# Patient Record
Sex: MALE | Race: WHITE | NOT HISPANIC OR LATINO | Employment: FULL TIME | URBAN - NONMETROPOLITAN AREA
[De-identification: names, ages, dates, MRNs, and addresses within clinical notes are randomized per-mention and may not be internally consistent; named-entity substitution may affect disease eponyms.]

---

## 2020-02-09 ENCOUNTER — ANESTHESIA EVENT (INPATIENT)
Dept: PERIOP | Facility: HOSPITAL | Age: 40
DRG: 355 | End: 2020-02-09
Payer: COMMERCIAL

## 2020-02-09 ENCOUNTER — APPOINTMENT (EMERGENCY)
Dept: CT IMAGING | Facility: HOSPITAL | Age: 40
DRG: 355 | End: 2020-02-09
Payer: COMMERCIAL

## 2020-02-09 ENCOUNTER — HOSPITAL ENCOUNTER (INPATIENT)
Facility: HOSPITAL | Age: 40
LOS: 1 days | Discharge: HOME | DRG: 355 | End: 2020-02-10
Attending: EMERGENCY MEDICINE | Admitting: SURGERY
Payer: COMMERCIAL

## 2020-02-09 DIAGNOSIS — K43.6 INCARCERATED VENTRAL HERNIA: Primary | ICD-10-CM

## 2020-02-09 DIAGNOSIS — K43.6 STRANGULATED HERNIA OF ABDOMINAL WALL: ICD-10-CM

## 2020-02-09 LAB
ALBUMIN SERPL BCP-MCNC: 4 G/DL (ref 3.5–5)
ALP SERPL-CCNC: 134 U/L (ref 46–116)
ALT SERPL W P-5'-P-CCNC: 34 U/L (ref 12–78)
ANION GAP SERPL CALCULATED.3IONS-SCNC: 5 MMOL/L (ref 4–13)
AST SERPL W P-5'-P-CCNC: 15 U/L (ref 5–45)
BASOPHILS # BLD AUTO: 0.06 THOUSANDS/ΜL (ref 0–0.1)
BASOPHILS NFR BLD AUTO: 1 % (ref 0–1)
BILIRUB DIRECT SERPL-MCNC: 0.11 MG/DL (ref 0–0.2)
BILIRUB SERPL-MCNC: 0.32 MG/DL (ref 0.2–1)
BUN SERPL-MCNC: 14 MG/DL (ref 5–25)
CALCIUM SERPL-MCNC: 8.3 MG/DL (ref 8.3–10.1)
CHLORIDE SERPL-SCNC: 103 MMOL/L (ref 100–108)
CO2 SERPL-SCNC: 31 MMOL/L (ref 21–32)
CREAT SERPL-MCNC: 0.97 MG/DL (ref 0.6–1.3)
EOSINOPHIL # BLD AUTO: 0.18 THOUSAND/ΜL (ref 0–0.61)
EOSINOPHIL NFR BLD AUTO: 2 % (ref 0–6)
ERYTHROCYTE [DISTWIDTH] IN BLOOD BY AUTOMATED COUNT: 12.6 % (ref 11.6–15.1)
GFR SERPL CREATININE-BSD FRML MDRD: 99 ML/MIN/1.73SQ M
GLUCOSE SERPL-MCNC: 88 MG/DL (ref 65–140)
HCT VFR BLD AUTO: 43.1 % (ref 36.5–49.3)
HGB BLD-MCNC: 14.1 G/DL (ref 12–17)
IMM GRANULOCYTES # BLD AUTO: 0.09 THOUSAND/UL (ref 0–0.2)
IMM GRANULOCYTES NFR BLD AUTO: 1 % (ref 0–2)
LACTATE SERPL-SCNC: 1.2 MMOL/L (ref 0.5–2)
LIPASE SERPL-CCNC: 78 U/L (ref 73–393)
LYMPHOCYTES # BLD AUTO: 2.28 THOUSANDS/ΜL (ref 0.6–4.47)
LYMPHOCYTES NFR BLD AUTO: 18 % (ref 14–44)
MCH RBC QN AUTO: 28.5 PG (ref 26.8–34.3)
MCHC RBC AUTO-ENTMCNC: 32.7 G/DL (ref 31.4–37.4)
MCV RBC AUTO: 87 FL (ref 82–98)
MONOCYTES # BLD AUTO: 0.92 THOUSAND/ΜL (ref 0.17–1.22)
MONOCYTES NFR BLD AUTO: 7 % (ref 4–12)
NEUTROPHILS # BLD AUTO: 8.84 THOUSANDS/ΜL (ref 1.85–7.62)
NEUTS SEG NFR BLD AUTO: 71 % (ref 43–75)
NRBC BLD AUTO-RTO: 0 /100 WBCS
PLATELET # BLD AUTO: 375 THOUSANDS/UL (ref 149–390)
PMV BLD AUTO: 10.3 FL (ref 8.9–12.7)
POTASSIUM SERPL-SCNC: 3.6 MMOL/L (ref 3.5–5.3)
PROT SERPL-MCNC: 7.6 G/DL (ref 6.4–8.2)
RBC # BLD AUTO: 4.95 MILLION/UL (ref 3.88–5.62)
SODIUM SERPL-SCNC: 139 MMOL/L (ref 136–145)
WBC # BLD AUTO: 12.37 THOUSAND/UL (ref 4.31–10.16)

## 2020-02-09 PROCEDURE — 74177 CT ABD & PELVIS W/CONTRAST: CPT

## 2020-02-09 PROCEDURE — 80076 HEPATIC FUNCTION PANEL: CPT | Performed by: EMERGENCY MEDICINE

## 2020-02-09 PROCEDURE — 83605 ASSAY OF LACTIC ACID: CPT | Performed by: EMERGENCY MEDICINE

## 2020-02-09 PROCEDURE — 99285 EMERGENCY DEPT VISIT HI MDM: CPT | Performed by: EMERGENCY MEDICINE

## 2020-02-09 PROCEDURE — 85025 COMPLETE CBC W/AUTO DIFF WBC: CPT | Performed by: EMERGENCY MEDICINE

## 2020-02-09 PROCEDURE — 83690 ASSAY OF LIPASE: CPT | Performed by: EMERGENCY MEDICINE

## 2020-02-09 PROCEDURE — 96374 THER/PROPH/DIAG INJ IV PUSH: CPT

## 2020-02-09 PROCEDURE — 80048 BASIC METABOLIC PNL TOTAL CA: CPT | Performed by: EMERGENCY MEDICINE

## 2020-02-09 PROCEDURE — 36415 COLL VENOUS BLD VENIPUNCTURE: CPT | Performed by: EMERGENCY MEDICINE

## 2020-02-09 PROCEDURE — 96375 TX/PRO/DX INJ NEW DRUG ADDON: CPT

## 2020-02-09 PROCEDURE — 96361 HYDRATE IV INFUSION ADD-ON: CPT

## 2020-02-09 PROCEDURE — 96376 TX/PRO/DX INJ SAME DRUG ADON: CPT

## 2020-02-09 PROCEDURE — 99285 EMERGENCY DEPT VISIT HI MDM: CPT

## 2020-02-09 RX ORDER — MORPHINE SULFATE 10 MG/ML
6 INJECTION, SOLUTION INTRAMUSCULAR; INTRAVENOUS
Status: DISCONTINUED | OUTPATIENT
Start: 2020-02-09 | End: 2020-02-10 | Stop reason: HOSPADM

## 2020-02-09 RX ORDER — SODIUM CHLORIDE 9 MG/ML
125 INJECTION, SOLUTION INTRAVENOUS CONTINUOUS
Status: DISCONTINUED | OUTPATIENT
Start: 2020-02-09 | End: 2020-02-10 | Stop reason: HOSPADM

## 2020-02-09 RX ORDER — ONDANSETRON 2 MG/ML
4 INJECTION INTRAMUSCULAR; INTRAVENOUS EVERY 4 HOURS PRN
Status: DISCONTINUED | OUTPATIENT
Start: 2020-02-09 | End: 2020-02-10 | Stop reason: HOSPADM

## 2020-02-09 RX ORDER — VILAZODONE HYDROCHLORIDE 20 MG/1
20 TABLET ORAL DAILY
COMMUNITY
Start: 2020-02-05

## 2020-02-09 RX ORDER — DIPHENHYDRAMINE HYDROCHLORIDE 50 MG/ML
25 INJECTION INTRAMUSCULAR; INTRAVENOUS ONCE
Status: COMPLETED | OUTPATIENT
Start: 2020-02-09 | End: 2020-02-09

## 2020-02-09 RX ORDER — DEXLANSOPRAZOLE 60 MG/1
60 CAPSULE, DELAYED RELEASE ORAL DAILY
COMMUNITY
Start: 2020-02-05 | End: 2020-03-18

## 2020-02-09 RX ORDER — MORPHINE SULFATE 10 MG/ML
6 INJECTION, SOLUTION INTRAMUSCULAR; INTRAVENOUS ONCE
Status: COMPLETED | OUTPATIENT
Start: 2020-02-09 | End: 2020-02-09

## 2020-02-09 RX ORDER — ALPRAZOLAM 0.25 MG/1
0.25 TABLET ORAL 3 TIMES DAILY PRN
COMMUNITY
End: 2021-12-19 | Stop reason: ALTCHOICE

## 2020-02-09 RX ORDER — METOCLOPRAMIDE HYDROCHLORIDE 5 MG/ML
10 INJECTION INTRAMUSCULAR; INTRAVENOUS ONCE
Status: COMPLETED | OUTPATIENT
Start: 2020-02-09 | End: 2020-02-09

## 2020-02-09 RX ORDER — ALPRAZOLAM 0.5 MG/1
0.5 TABLET ORAL 3 TIMES DAILY PRN
Status: DISCONTINUED | OUTPATIENT
Start: 2020-02-09 | End: 2020-02-10 | Stop reason: HOSPADM

## 2020-02-09 RX ORDER — ONDANSETRON 2 MG/ML
4 INJECTION INTRAMUSCULAR; INTRAVENOUS ONCE
Status: COMPLETED | OUTPATIENT
Start: 2020-02-09 | End: 2020-02-09

## 2020-02-09 RX ORDER — RANITIDINE 150 MG/1
150 TABLET ORAL
COMMUNITY
Start: 2018-12-05 | End: 2020-03-18

## 2020-02-09 RX ORDER — OMEPRAZOLE 20 MG/1
20 CAPSULE, DELAYED RELEASE ORAL DAILY
COMMUNITY
End: 2020-03-18

## 2020-02-09 RX ADMIN — MORPHINE SULFATE 6 MG: 10 INJECTION INTRAVENOUS at 10:42

## 2020-02-09 RX ADMIN — ONDANSETRON 4 MG: 2 INJECTION INTRAMUSCULAR; INTRAVENOUS at 15:29

## 2020-02-09 RX ADMIN — SODIUM CHLORIDE 125 ML/HR: 0.9 INJECTION, SOLUTION INTRAVENOUS at 05:51

## 2020-02-09 RX ADMIN — ALPRAZOLAM 0.5 MG: 0.5 TABLET ORAL at 08:51

## 2020-02-09 RX ADMIN — ONDANSETRON 4 MG: 2 INJECTION INTRAMUSCULAR; INTRAVENOUS at 02:48

## 2020-02-09 RX ADMIN — ONDANSETRON 4 MG: 2 INJECTION INTRAMUSCULAR; INTRAVENOUS at 08:51

## 2020-02-09 RX ADMIN — SODIUM CHLORIDE 1000 ML: 0.9 INJECTION, SOLUTION INTRAVENOUS at 00:59

## 2020-02-09 RX ADMIN — ONDANSETRON 4 MG: 2 INJECTION INTRAMUSCULAR; INTRAVENOUS at 21:33

## 2020-02-09 RX ADMIN — SODIUM CHLORIDE 125 ML/HR: 0.9 INJECTION, SOLUTION INTRAVENOUS at 13:25

## 2020-02-09 RX ADMIN — DIPHENHYDRAMINE HYDROCHLORIDE 25 MG: 50 INJECTION, SOLUTION INTRAMUSCULAR; INTRAVENOUS at 01:01

## 2020-02-09 RX ADMIN — IOHEXOL 100 ML: 350 INJECTION, SOLUTION INTRAVENOUS at 01:50

## 2020-02-09 RX ADMIN — MORPHINE SULFATE 6 MG: 10 INJECTION INTRAVENOUS at 21:32

## 2020-02-09 RX ADMIN — MORPHINE SULFATE 6 MG: 10 INJECTION INTRAVENOUS at 02:46

## 2020-02-09 RX ADMIN — ALPRAZOLAM 0.5 MG: 0.5 TABLET ORAL at 21:33

## 2020-02-09 RX ADMIN — METOCLOPRAMIDE HYDROCHLORIDE 10 MG: 5 INJECTION INTRAMUSCULAR; INTRAVENOUS at 01:01

## 2020-02-09 RX ADMIN — MORPHINE SULFATE 6 MG: 10 INJECTION INTRAVENOUS at 18:18

## 2020-02-09 RX ADMIN — SODIUM CHLORIDE 125 ML/HR: 0.9 INJECTION, SOLUTION INTRAVENOUS at 21:47

## 2020-02-09 RX ADMIN — MORPHINE SULFATE 6 MG: 10 INJECTION INTRAVENOUS at 01:01

## 2020-02-09 RX ADMIN — MORPHINE SULFATE 6 MG: 10 INJECTION INTRAVENOUS at 15:24

## 2020-02-09 NOTE — ED PROVIDER NOTES
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, vomiting and abdominal pain  Hx of ventral hernia w/prior incarceration, but ileus at that time which was managed medically and the patient did not have surgery  Did not take any pain medication PTA  History provided by:  Patient   used: No    Abdominal Pain   Pain location:  Generalized  Pain quality: aching, sharp and throbbing    Pain radiates to:  Does not radiate  Pain severity:  Severe  Onset quality:  Sudden  Timing:  Constant  Progression:  Unchanged  Chronicity:  Recurrent  Relieved by:  Nothing  Worsened by: Movement and palpation  Ineffective treatments:  None tried  Associated symptoms: no anorexia, no belching, no chest pain, no chills, no constipation, no cough, no diarrhea, no fatigue, no fever, no flatus, no hematemesis, no hematochezia, no hematuria, no nausea, no shortness of breath, no sore throat and no vomiting        Prior to Admission Medications   Prescriptions Last Dose Informant Patient Reported? Taking? ALPRAZolam (XANAX) 0 25 mg tablet  Self Yes Yes   Sig: Take 0 25 mg by mouth 3 (three) times a day as needed for anxiety   dexlansoprazole (DEXILANT) 60 MG capsule Not Taking at Unknown time  Yes No   Sig: Take 60 mg by mouth daily    omeprazole (PriLOSEC) 20 mg delayed release capsule 2/8/2020 at Unknown time  Yes Yes   Sig: Take 20 mg by mouth daily   ranitidine (ZANTAC) 150 mg tablet Not Taking at Unknown time  Yes No   Sig: Take 150 mg by mouth   vilazodone (VIIBRYD) 20 mg tablet 2/8/2020 at Unknown time  Yes Yes   Sig: Take 20 mg by mouth daily      Facility-Administered Medications: None       History reviewed  No pertinent past medical history  History reviewed  No pertinent surgical history  History reviewed   No pertinent family history  I have reviewed and agree with the history as documented  Social History     Tobacco Use    Smoking status: Never Smoker    Smokeless tobacco: Never Used   Substance Use Topics    Alcohol use: Yes     Comment: rarely    Drug use: Not Currently        Review of Systems   Constitutional: Negative for chills, fatigue and fever  HENT: Negative for sore throat  Eyes: Negative for visual disturbance  Respiratory: Negative for cough and shortness of breath  Cardiovascular: Negative for chest pain  Gastrointestinal: Positive for abdominal pain  Negative for anorexia, constipation, diarrhea, flatus, hematemesis, hematochezia, nausea and vomiting  Genitourinary: Negative for difficulty urinating, flank pain, hematuria and scrotal swelling  Musculoskeletal: Negative for arthralgias, gait problem and myalgias  Neurological: Negative for dizziness and weakness  Hematological: Does not bruise/bleed easily  Psychiatric/Behavioral: Negative for agitation, behavioral problems and confusion  Physical Exam  Physical Exam   Constitutional: He is oriented to person, place, and time  He appears well-developed and well-nourished  HENT:   Head: Normocephalic and atraumatic  Mouth/Throat: Oropharynx is clear and moist    Eyes: Pupils are equal, round, and reactive to light  Cardiovascular: Normal rate and regular rhythm  Pulmonary/Chest: Effort normal    Abdominal: Bowel sounds are normal  He exhibits distension  He exhibits no mass  There is generalized tenderness  There is no rigidity, no rebound and no guarding  No hernia  Hernia confirmed negative in the right inguinal area and confirmed negative in the left inguinal area  Cannot palpate any obvious hernia     Neurological: He is alert and oriented to person, place, and time  Skin: Skin is warm  Capillary refill takes less than 2 seconds  Psychiatric: He has a normal mood and affect   His behavior is normal  Vital Signs  ED Triage Vitals   Temperature Pulse Respirations Blood Pressure SpO2   02/09/20 0019 02/09/20 0019 02/09/20 0019 02/09/20 0019 02/09/20 0019   97 9 °F (36 6 °C) 86 19 144/93 99 %      Temp Source Heart Rate Source Patient Position - Orthostatic VS BP Location FiO2 (%)   02/09/20 0019 02/09/20 0140 02/09/20 0019 02/09/20 0019 --   Oral Monitor Lying Left arm       Pain Score       02/09/20 0019       7           Vitals:    02/09/20 0140 02/09/20 0245 02/09/20 0423 02/09/20 0500   BP: 110/61 100/57 116/70 109/72   Pulse: 74 66 58 70   Patient Position - Orthostatic VS: Lying Lying Lying          Visual Acuity      ED Medications  Medications   sodium chloride 0 9 % infusion (125 mL/hr Intravenous New Bag 2/9/20 0551)   morphine (PF) 10 mg/mL injection 6 mg (has no administration in time range)   ondansetron (ZOFRAN) injection 4 mg (has no administration in time range)   metoclopramide (REGLAN) injection 10 mg (10 mg Intravenous Given 2/9/20 0101)   diphenhydrAMINE (BENADRYL) injection 25 mg (25 mg Intravenous Given 2/9/20 0101)   morphine (PF) 10 mg/mL injection 6 mg (6 mg Intravenous Given 2/9/20 0101)   sodium chloride 0 9 % bolus 1,000 mL (0 mL Intravenous Stopped 2/9/20 0142)   iohexol (OMNIPAQUE) 350 MG/ML injection (SINGLE-DOSE) 100 mL (100 mL Intravenous Given 2/9/20 0150)   morphine (PF) 10 mg/mL injection 6 mg (6 mg Intravenous Given 2/9/20 0246)   ondansetron (ZOFRAN) injection 4 mg (4 mg Intravenous Given 2/9/20 0248)       Diagnostic Studies  Results Reviewed     Procedure Component Value Units Date/Time    Lactic acid, plasma [[id number]]  (Normal) Collected:  02/09/20 0059    Lab Status:  Final result Specimen:  Blood from Arm, Right Updated:  02/09/20 0133     LACTIC ACID 1 2 mmol/L     Narrative:       Result may be elevated if tourniquet was used during collection      Basic metabolic panel [[id number]] Collected:  02/09/20 0059    Lab Status:  Final result Specimen:  Blood from Arm, Right Updated:  02/09/20 0128     Sodium 139 mmol/L      Potassium 3 6 mmol/L      Chloride 103 mmol/L      CO2 31 mmol/L      ANION GAP 5 mmol/L      BUN 14 mg/dL      Creatinine 0 97 mg/dL      Glucose 88 mg/dL      Calcium 8 3 mg/dL      eGFR 99 ml/min/1 73sq m     Narrative:       National Kidney Disease Foundation guidelines for Chronic Kidney Disease (CKD):     Stage 1 with normal or high GFR (GFR > 90 mL/min/1 73 square meters)    Stage 2 Mild CKD (GFR = 60-89 mL/min/1 73 square meters)    Stage 3A Moderate CKD (GFR = 45-59 mL/min/1 73 square meters)    Stage 3B Moderate CKD (GFR = 30-44 mL/min/1 73 square meters)    Stage 4 Severe CKD (GFR = 15-29 mL/min/1 73 square meters)    Stage 5 End Stage CKD (GFR <15 mL/min/1 73 square meters)  Note: GFR calculation is accurate only with a steady state creatinine    Hepatic function panel [[id number]]  (Abnormal) Collected:  02/09/20 0059    Lab Status:  Final result Specimen:  Blood from Arm, Right Updated:  02/09/20 0128     Total Bilirubin 0 32 mg/dL      Bilirubin, Direct 0 11 mg/dL      Alkaline Phosphatase 134 U/L      AST 15 U/L      ALT 34 U/L      Total Protein 7 6 g/dL      Albumin 4 0 g/dL     Lipase [[id number]]  (Normal) Collected:  02/09/20 0059    Lab Status:  Final result Specimen:  Blood from Arm, Right Updated:  02/09/20 0128     Lipase 78 u/L     CBC and differential [[id number]]  (Abnormal) Collected:  02/09/20 0059    Lab Status:  Final result Specimen:  Blood from Arm, Right Updated:  02/09/20 0118     WBC 12 37 Thousand/uL      RBC 4 95 Million/uL      Hemoglobin 14 1 g/dL      Hematocrit 43 1 %      MCV 87 fL      MCH 28 5 pg      MCHC 32 7 g/dL      RDW 12 6 %      MPV 10 3 fL      Platelets 351 Thousands/uL      nRBC 0 /100 WBCs      Neutrophils Relative 71 %      Immat GRANS % 1 %      Lymphocytes Relative 18 %      Monocytes Relative 7 %      Eosinophils Relative 2 %      Basophils Relative 1 %      Neutrophils Absolute 8 84 Thousands/µL Immature Grans Absolute 0 09 Thousand/uL      Lymphocytes Absolute 2 28 Thousands/µL      Monocytes Absolute 0 92 Thousand/µL      Eosinophils Absolute 0 18 Thousand/µL      Basophils Absolute 0 06 Thousands/µL                  CT abdomen pelvis with contrast   Final Result by Samantha Garcia MD (02/09 8794)      Supraventral  fat-containing hernia with herniated fat as well as underlying mesenteric/omental fat demonstrating increased congestion/edema consistent with strangulation  Workstation performed: [id number]                    Procedures  Procedures         ED Course                               MDM  Number of Diagnoses or Management Options  Strangulated hernia of abdominal wall:   Diagnosis management comments: 47 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, vomiting and abdominal pain  Hx of ventral hernia w/prior incarceration, but ileus at that time which was managed medically and the patient did not have surgery  He was given antiemetics and pain medication here x 2 w/reduction, but not resolution of pain  Lab work was normal as well as unremarkable VS  The patient's CT showed a strangulated ventral fat containing hernia w/stranding  There is no bowel component  Discussed w/Dr Soledad Tejada who agrees to admit the patient to her service, possible OR tomorrow for hernia defect repair  Prn pain / nausea control in the interim          Disposition  Final diagnoses:   Strangulated hernia of abdominal wall     Time reflects when diagnosis was documented in both MDM as applicable and the Disposition within this note     Time User Action Codes Description Comment    2/9/2020  6:56 AM Melvin Collado Add [K43 6] Strangulated hernia of abdominal wall       ED Disposition     ED Disposition Condition Date/Time Comment    Admit Stable Sun Feb 9, 2020  6:56 AM Case was discussed with Dr Soledad Tejada and the patient's admission status was agreed to be Admission Status: inpatient status to the service of Dr Harvinder Mendoza   Follow-up Information    None         Current Discharge Medication List      CONTINUE these medications which have NOT CHANGED    Details   ALPRAZolam (XANAX) 0 25 mg tablet Take 0 25 mg by mouth 3 (three) times a day as needed for anxiety      omeprazole (PriLOSEC) 20 mg delayed release capsule Take 20 mg by mouth daily      vilazodone (VIIBRYD) 20 mg tablet Take 20 mg by mouth daily      dexlansoprazole (DEXILANT) 60 MG capsule Take 60 mg by mouth daily       ranitidine (ZANTAC) 150 mg tablet Take 150 mg by mouth           No discharge procedures on file      ED Provider  Electronically Signed by           Wanda Encarnacion MD  02/09/20 5574

## 2020-02-09 NOTE — PROGRESS NOTES
Progress Note - General Surgery   Suellen Loo 46 y o  male MRN: [id number]  Unit/Bed#: -01 Encounter: [id number]    Assessment:  H and P  Ventral hernia partially incarcerated  Abdominal pain secondary to ventral hernia    Plan:  I recommend repair of ventral hernia with possible mesh for this patient  Possible risks including bleeding infection blood clot injury to bowel and return hernia were discussed with him  Patient's questions were answered and he understands and agrees with the plan  We will plan to do this tomorrow morning  Subjective/Objective   Chief Complaint:  Abdominal pain    Subjective: The patient presents with complaint of abdominal pain intermittent pain getting worse yesterday  He does have a history of prior umbilical ventral hernia back in 2018  He was going to be repaired however he states he had some bowel issues and possible ileus therefore it was not repaired at that time  The patient then states he had increasing pain and nausea all for yesterday increasing into the early this morning  He denies any chest pain or shortness of breath  He denies any vomiting  He denies any constipation  Objective:     Blood pressure 109/72, pulse 70, temperature 98 3 °F (36 8 °C), temperature source Oral, resp  rate 16, height 6' (1 829 m), weight 133 kg (294 lb 1 5 oz), SpO2 94 %  ,Body mass index is 39 89 kg/m²  Intake/Output Summary (Last 24 hours) at 2/9/2020 0819  Last data filed at 2/9/2020 0142  Gross per 24 hour   Intake 1000 ml   Output --   Net 1000 ml       Invasive Devices     Peripheral Intravenous Line            Peripheral IV 02/09/20 Right Antecubital less than 1 day                Physical Exam:  The patient is awake and alert with mild distress  Abdomen was soft with partially reducible supraumbilical hernia noted  The area is tender  There is no rigidity guarding or rebound      Lab, Imaging and other studies:  I have personally reviewed pertinent lab results    , CBC:   Lab Results   Component Value Date    WBC 12 37 (H) 02/09/2020    HGB 14 1 02/09/2020    HCT 43 1 02/09/2020    MCV 87 02/09/2020     02/09/2020    MCH 28 5 02/09/2020    MCHC 32 7 02/09/2020    RDW 12 6 02/09/2020    MPV 10 3 02/09/2020    NRBC 0 02/09/2020   , CMP:   Lab Results   Component Value Date    SODIUM 139 02/09/2020    K 3 6 02/09/2020     02/09/2020    CO2 31 02/09/2020    BUN 14 02/09/2020    CREATININE 0 97 02/09/2020    CALCIUM 8 3 02/09/2020    AST 15 02/09/2020    ALT 34 02/09/2020    ALKPHOS 134 (H) 02/09/2020    EGFR 99 02/09/2020     VTE Pharmacologic Prophylaxis: Sequential compression device (Venodyne)   VTE Mechanical Prophylaxis: sequential compression device

## 2020-02-09 NOTE — ED NOTES
[facility] [address]  02/09/20 8219

## 2020-02-10 ENCOUNTER — ANESTHESIA (INPATIENT)
Dept: PERIOP | Facility: HOSPITAL | Age: 40
DRG: 355 | End: 2020-02-10
Payer: COMMERCIAL

## 2020-02-10 VITALS
TEMPERATURE: 97.9 F | HEIGHT: 72 IN | OXYGEN SATURATION: 94 % | WEIGHT: 294 LBS | HEART RATE: 95 BPM | DIASTOLIC BLOOD PRESSURE: 58 MMHG | SYSTOLIC BLOOD PRESSURE: 115 MMHG | RESPIRATION RATE: 22 BRPM | BODY MASS INDEX: 39.82 KG/M2

## 2020-02-10 PROBLEM — K43.6 INCARCERATED VENTRAL HERNIA: Status: RESOLVED | Noted: 2020-02-09 | Resolved: 2020-02-10

## 2020-02-10 PROCEDURE — 88302 TISSUE EXAM BY PATHOLOGIST: CPT | Performed by: PATHOLOGY

## 2020-02-10 PROCEDURE — 99223 1ST HOSP IP/OBS HIGH 75: CPT | Performed by: PHYSICIAN ASSISTANT

## 2020-02-10 PROCEDURE — 0WQF0ZZ REPAIR ABDOMINAL WALL, OPEN APPROACH: ICD-10-PCS | Performed by: SURGERY

## 2020-02-10 PROCEDURE — 49561 PR REPAIR INCISIONAL HERNIA,STRANG: CPT | Performed by: PHYSICIAN ASSISTANT

## 2020-02-10 RX ORDER — FENTANYL CITRATE/PF 50 MCG/ML
50 SYRINGE (ML) INJECTION
Status: DISCONTINUED | OUTPATIENT
Start: 2020-02-10 | End: 2020-02-10 | Stop reason: HOSPADM

## 2020-02-10 RX ORDER — GLYCOPYRROLATE 0.2 MG/ML
INJECTION INTRAMUSCULAR; INTRAVENOUS AS NEEDED
Status: DISCONTINUED | OUTPATIENT
Start: 2020-02-10 | End: 2020-02-10 | Stop reason: SURG

## 2020-02-10 RX ORDER — KETAMINE HCL IN NACL, ISO-OSM 100MG/10ML
SYRINGE (ML) INJECTION AS NEEDED
Status: DISCONTINUED | OUTPATIENT
Start: 2020-02-10 | End: 2020-02-10 | Stop reason: SURG

## 2020-02-10 RX ORDER — FENTANYL CITRATE 50 UG/ML
INJECTION, SOLUTION INTRAMUSCULAR; INTRAVENOUS AS NEEDED
Status: DISCONTINUED | OUTPATIENT
Start: 2020-02-10 | End: 2020-02-10 | Stop reason: SURG

## 2020-02-10 RX ORDER — HYDROMORPHONE HCL/PF 1 MG/ML
0.5 SYRINGE (ML) INJECTION
Status: DISCONTINUED | OUTPATIENT
Start: 2020-02-10 | End: 2020-02-10 | Stop reason: HOSPADM

## 2020-02-10 RX ORDER — ROCURONIUM BROMIDE 10 MG/ML
INJECTION, SOLUTION INTRAVENOUS AS NEEDED
Status: DISCONTINUED | OUTPATIENT
Start: 2020-02-10 | End: 2020-02-10 | Stop reason: SURG

## 2020-02-10 RX ORDER — MIDAZOLAM HYDROCHLORIDE 2 MG/2ML
INJECTION, SOLUTION INTRAMUSCULAR; INTRAVENOUS AS NEEDED
Status: DISCONTINUED | OUTPATIENT
Start: 2020-02-10 | End: 2020-02-10 | Stop reason: SURG

## 2020-02-10 RX ORDER — LIDOCAINE HYDROCHLORIDE 10 MG/ML
INJECTION, SOLUTION EPIDURAL; INFILTRATION; INTRACAUDAL; PERINEURAL AS NEEDED
Status: DISCONTINUED | OUTPATIENT
Start: 2020-02-10 | End: 2020-02-10 | Stop reason: SURG

## 2020-02-10 RX ORDER — CEFAZOLIN SODIUM 2 G/50ML
2000 SOLUTION INTRAVENOUS
Status: COMPLETED | OUTPATIENT
Start: 2020-02-10 | End: 2020-02-10

## 2020-02-10 RX ORDER — MAGNESIUM HYDROXIDE 1200 MG/15ML
LIQUID ORAL AS NEEDED
Status: DISCONTINUED | OUTPATIENT
Start: 2020-02-10 | End: 2020-02-10 | Stop reason: HOSPADM

## 2020-02-10 RX ORDER — NEOSTIGMINE METHYLSULFATE 1 MG/ML
INJECTION INTRAVENOUS AS NEEDED
Status: DISCONTINUED | OUTPATIENT
Start: 2020-02-10 | End: 2020-02-10 | Stop reason: SURG

## 2020-02-10 RX ORDER — ONDANSETRON 2 MG/ML
4 INJECTION INTRAMUSCULAR; INTRAVENOUS ONCE AS NEEDED
Status: DISCONTINUED | OUTPATIENT
Start: 2020-02-10 | End: 2020-02-10 | Stop reason: HOSPADM

## 2020-02-10 RX ORDER — BUPIVACAINE HYDROCHLORIDE AND EPINEPHRINE 2.5; 5 MG/ML; UG/ML
INJECTION, SOLUTION INFILTRATION; PERINEURAL AS NEEDED
Status: DISCONTINUED | OUTPATIENT
Start: 2020-02-10 | End: 2020-02-10 | Stop reason: HOSPADM

## 2020-02-10 RX ORDER — DEXAMETHASONE SODIUM PHOSPHATE 4 MG/ML
INJECTION, SOLUTION INTRA-ARTICULAR; INTRALESIONAL; INTRAMUSCULAR; INTRAVENOUS; SOFT TISSUE AS NEEDED
Status: DISCONTINUED | OUTPATIENT
Start: 2020-02-10 | End: 2020-02-10 | Stop reason: SURG

## 2020-02-10 RX ORDER — PROPOFOL 10 MG/ML
INJECTION, EMULSION INTRAVENOUS AS NEEDED
Status: DISCONTINUED | OUTPATIENT
Start: 2020-02-10 | End: 2020-02-10 | Stop reason: SURG

## 2020-02-10 RX ORDER — OXYCODONE HYDROCHLORIDE AND ACETAMINOPHEN 5; 325 MG/1; MG/1
1 TABLET ORAL EVERY 4 HOURS PRN
Qty: 25 TABLET | Refills: 0 | Status: SHIPPED | OUTPATIENT
Start: 2020-02-10 | End: 2020-02-20

## 2020-02-10 RX ORDER — SUCCINYLCHOLINE/SOD CL,ISO/PF 100 MG/5ML
SYRINGE (ML) INTRAVENOUS AS NEEDED
Status: DISCONTINUED | OUTPATIENT
Start: 2020-02-10 | End: 2020-02-10 | Stop reason: SURG

## 2020-02-10 RX ADMIN — CEFAZOLIN SODIUM 3000 MG: 2 SOLUTION INTRAVENOUS at 12:27

## 2020-02-10 RX ADMIN — ONDANSETRON 4 MG: 2 INJECTION INTRAMUSCULAR; INTRAVENOUS at 13:05

## 2020-02-10 RX ADMIN — ONDANSETRON 4 MG: 2 INJECTION INTRAMUSCULAR; INTRAVENOUS at 07:58

## 2020-02-10 RX ADMIN — FENTANYL CITRATE 100 MCG: 50 INJECTION, SOLUTION INTRAMUSCULAR; INTRAVENOUS at 12:23

## 2020-02-10 RX ADMIN — ALPRAZOLAM 0.5 MG: 0.5 TABLET ORAL at 08:51

## 2020-02-10 RX ADMIN — Medication 50 MG: at 12:23

## 2020-02-10 RX ADMIN — SODIUM CHLORIDE 125 ML/HR: 0.9 INJECTION, SOLUTION INTRAVENOUS at 06:32

## 2020-02-10 RX ADMIN — LIDOCAINE HYDROCHLORIDE 100 MG: 10 INJECTION, SOLUTION EPIDURAL; INFILTRATION; INTRACAUDAL; PERINEURAL at 12:23

## 2020-02-10 RX ADMIN — DEXAMETHASONE SODIUM PHOSPHATE 8 MG: 4 INJECTION, SOLUTION INTRAMUSCULAR; INTRAVENOUS at 13:05

## 2020-02-10 RX ADMIN — GLYCOPYRROLATE 0.6 MG: 0.2 INJECTION, SOLUTION INTRAMUSCULAR; INTRAVENOUS at 13:21

## 2020-02-10 RX ADMIN — PROPOFOL 180 MG: 10 INJECTION, EMULSION INTRAVENOUS at 12:23

## 2020-02-10 RX ADMIN — MORPHINE SULFATE 6 MG: 10 INJECTION INTRAVENOUS at 14:34

## 2020-02-10 RX ADMIN — ROCURONIUM BROMIDE 35 MG: 10 INJECTION, SOLUTION INTRAVENOUS at 12:30

## 2020-02-10 RX ADMIN — SODIUM CHLORIDE 125 ML/HR: 0.9 INJECTION, SOLUTION INTRAVENOUS at 14:38

## 2020-02-10 RX ADMIN — NEOSTIGMINE METHYLSULFATE 4 MG: 1 INJECTION, SOLUTION INTRAVENOUS at 13:21

## 2020-02-10 RX ADMIN — ROCURONIUM BROMIDE 5 MG: 10 INJECTION, SOLUTION INTRAVENOUS at 12:23

## 2020-02-10 RX ADMIN — MORPHINE SULFATE 6 MG: 10 INJECTION INTRAVENOUS at 07:57

## 2020-02-10 RX ADMIN — MIDAZOLAM HYDROCHLORIDE 2 MG: 1 INJECTION, SOLUTION INTRAMUSCULAR; INTRAVENOUS at 12:19

## 2020-02-10 RX ADMIN — Medication 100 MG: at 12:23

## 2020-02-10 NOTE — CASE MANAGEMENT
Discussed in case anticipate dc today  Pod 0 ventral hernia repair  No needs identified at this time  Home with family support

## 2020-02-10 NOTE — PLAN OF CARE
Problem: Potential for Falls  Goal: Patient will remain free of falls  Description  INTERVENTIONS:  - Assess patient frequently for physical needs  -  Identify cognitive and physical deficits and behaviors that affect risk of falls  -  [employer] fall precautions as indicated by assessment   - Educate patient/family on patient safety including physical limitations  - Instruct patient to call for assistance with activity based on assessment  - Modify environment to reduce risk of injury  - Consider OT/PT consult to assist with strengthening/mobility  2/10/2020 0508 by Nba Kaminski RN  Outcome: Progressing  2/10/2020 0507 by Nba Kaminski RN  Outcome: Progressing     Problem: Nutrition/Hydration-ADULT  Goal: Nutrient/Hydration intake appropriate for improving, restoring or maintaining nutritional needs  Description  Monitor and assess patient's nutrition/hydration status for malnutrition  Collaborate with interdisciplinary team and initiate plan and interventions as ordered  Monitor patient's weight and dietary intake as ordered or per policy  Utilize nutrition screening tool and intervene as necessary  Determine patient's food preferences and provide high-protein, high-caloric foods as appropriate       INTERVENTIONS:  - Monitor oral intake, urinary output, labs, and treatment plans  - Assess nutrition and hydration status and recommend course of action  - Evaluate amount of meals eaten  - Assist patient with eating if necessary   - Allow adequate time for meals  - Recommend/ encourage appropriate diets, oral nutritional supplements, and vitamin/mineral supplements  - Order, calculate, and assess calorie counts as needed  - Recommend, monitor, and adjust tube feedings and TPN/PPN based on assessed needs  - Assess need for intravenous fluids  - Provide specific nutrition/hydration education as appropriate  - Include patient/family/caregiver in decisions related to nutrition  2/10/2020 0508 by Abdoul Sommers Jayjay Robles RN  Outcome: Progressing  2/10/2020 0507 by Lor Connell RN  Outcome: Progressing     Problem: PAIN - ADULT  Goal: Verbalizes/displays adequate comfort level or baseline comfort level  Description  Interventions:  - Encourage patient to monitor pain and request assistance  - Assess pain using appropriate pain scale  - Administer analgesics based on type and severity of pain and evaluate response  - Implement non-pharmacological measures as appropriate and evaluate response  - Consider cultural and social influences on pain and pain management  - Notify physician/advanced practitioner if interventions unsuccessful or patient reports new pain  Outcome: Progressing     Problem: DISCHARGE PLANNING  Goal: Discharge to home or other facility with appropriate resources  Description  INTERVENTIONS:  - Identify barriers to discharge w/patient and caregiver  - Arrange for needed discharge resources and transportation as appropriate  - Identify discharge learning needs (meds, wound care, etc )  - Arrange for interpretive services to assist at discharge as needed  - Refer to Case Management Department for coordinating discharge planning if the patient needs post-hospital services based on physician/advanced practitioner order or complex needs related to functional status, cognitive ability, or social support system  Outcome: Progressing

## 2020-02-10 NOTE — ANESTHESIA POSTPROCEDURE EVALUATION
Post-Op Assessment Note    CV Status:  Stable  Pain Score: 0    Pain management: satisfactory to patient     Mental Status:  Awake and sleepy   Hydration Status:  Stable   PONV Controlled:  None   Airway Patency:  Patent   Post Op Vitals Reviewed: Yes      Staff: Anesthesiologist, CRNA           BP   101/78   Temp   98 8   Pulse  95   Resp   18   SpO2   95 on 6L Mask

## 2020-02-10 NOTE — UTILIZATION REVIEW
Initial Clinical Review    Admission: Date/Time/Statement: Admission Orders (From admission, onward)     Ordered        02/09/20 0405  Inpatient Admission (expected length of stay for this patient Order details is greater than two midnights)  Once                   Orders Placed This Encounter   Procedures    Inpatient Admission (expected length of stay for this patient Order details is greater than two midnights)     Standing Status:   Standing     Number of Occurrences:   1     Order Specific Question:   Admitting Physician     Answer:   Ashley Parnell [[id number]]     Order Specific Question:   Level of Care     Answer:   Med Surg [16]     Order Specific Question:   Estimated length of stay     Answer:   More than 2 Midnights     Order Specific Question:   Certification     Answer:   I certify that inpatient services are medically necessary for this patient for a duration of greater than two midnights  See H&P and MD Progress Notes for additional information about the patient's course of treatment  ED Arrival Information     Expected Arrival Acuity Means of Arrival Escorted By Service Admission Type    - 2/9/2020 00:18 Urgent Stretcher [facility]  Surgery-General Urgent    Arrival Complaint    vomiting        Chief Complaint   Patient presents with    Abdominal Pain     patient reports abdominal pain, nausea, vomiting, and tarry stool  reports known ventral hernia  Assessment/Plan: 46year old male to the ED from home via EMS with complaints of abdominal pain, nausea, vomiting, tarry stool  With known hernia  Admitted to inpatient for ventral hernia, with partial incarceration and abdominal pain  Has h/o same and has opted for medical management in the past     Pain worse since yesterday  NPO  Iv fluids, prn pain and nausea medications  Currently requiring frequent doses of IV morphine  He is tender to palpation over periumbilical hernia that is not reducible    +Bowel sounds, non distended     ED Triage Vitals   Temperature Pulse Respirations Blood Pressure SpO2   02/09/20 0019 02/09/20 0019 02/09/20 0019 02/09/20 0019 02/09/20 0019   97 9 °F (36 6 °C) 86 19 144/93 99 %      Temp Source Heart Rate Source Patient Position - Orthostatic VS BP Location FiO2 (%)   02/09/20 0019 02/09/20 0140 02/09/20 0019 02/09/20 0019 --   Oral Monitor Lying Left arm       Pain Score       02/09/20 0019       7        Wt Readings from Last 1 Encounters:   02/09/20 133 kg (294 lb 1 5 oz)     Additional Vital Signs:   Date/Time  Temp  Pulse  Resp  BP  MAP (mmHg)  SpO2  O2 Device  Patient Position - Orthostatic VS   02/10/20 0754  97 9 °F (36 6 °C)  70  18  131/84  113  97 %  None (Room air)  Lying   02/10/20 0545  97 9 °F (36 6 °C)  67  20  122/80  95  96 %  None (Room air)  Sitting   02/09/20 1930  98 2 °F (36 8 °C)  --  18  --  --  --  None (Room air)  Sitting   02/09/20 1603  97 7 °F (36 5 °C)  68  16  117/60  83  95 %  None (Room air)  Sitting   02/09/20 0835  98 °F (36 7 °C)  73  16  112/73  88  95 %  --  Sitting   02/09/20 0800  --  --  --  --  --  96 %  --  --   02/09/20 0500  98 3 °F (36 8 °C)  70  16  109/72  85  94 %  --  --   02/09/20 0423  --  58  18  116/70  --  95 %  None (Room air)  Lying   02/09/20 0245  --  66  18  100/57  --  97 %  None (Room air)  Lying   02/09/20 0140  --  74  18  110/61  --           Pertinent Labs/Diagnostic Test Results:   CT Abdomen/pelvis w contrast, 2/9/2020  Impression:       Supraventral  fat-containing hernia with herniated fat as well as underlying mesenteric/omental fat demonstrating increased congestion/edema consistent with strangulation         Results from last 7 days   Lab Units 02/09/20  0059   WBC Thousand/uL 12 37*   HEMOGLOBIN g/dL 14 1   HEMATOCRIT % 43 1   PLATELETS Thousands/uL 375   NEUTROS ABS Thousands/µL 8 84*         Results from last 7 days   Lab Units 02/09/20  0059   SODIUM mmol/L 139   POTASSIUM mmol/L 3 6   CHLORIDE mmol/L 103   CO2 mmol/L 31   ANION GAP mmol/L 5   BUN mg/dL 14   CREATININE mg/dL 0 97   EGFR ml/min/1 73sq m 99   CALCIUM mg/dL 8 3     Results from last 7 days   Lab Units 02/09/20  0059   AST U/L 15   ALT U/L 34   ALK PHOS U/L 134*   TOTAL PROTEIN g/dL 7 6   ALBUMIN g/dL 4 0   TOTAL BILIRUBIN mg/dL 0 32   BILIRUBIN DIRECT mg/dL 0 11         Results from last 7 days   Lab Units 02/09/20  0059   GLUCOSE RANDOM mg/dL 88     Results from last 7 days   Lab Units 02/09/20  0059   LACTIC ACID mmol/L 1 2     Results from last 7 days   Lab Units 02/09/20  0059   LIPASE u/L 78       ED Treatment:   Medication Administration from 02/09/2020 0018 to 02/09/2020 0448       Date/Time Order Dose Route Action     02/09/2020 0101 metoclopramide (REGLAN) injection 10 mg 10 mg Intravenous Given     02/09/2020 0101 diphenhydrAMINE (BENADRYL) injection 25 mg 25 mg Intravenous Given     02/09/2020 0101 morphine (PF) 10 mg/mL injection 6 mg 6 mg Intravenous Given     02/09/2020 0059 sodium chloride 0 9 % bolus 1,000 mL 1,000 mL Intravenous New Bag     02/09/2020 0246 morphine (PF) 10 mg/mL injection 6 mg 6 mg Intravenous Given     02/09/2020 0248 ondansetron (ZOFRAN) injection 4 mg 4 mg Intravenous Given          Admitting Diagnosis: Vomiting [R11 10]  Age/Sex: 46 y o  male  Admission Orders:  NPO  Scheduled Medications:    Medications:  cefazolin 2,000 mg Intravenous On Call To OR     Continuous IV Infusions:    sodium chloride 125 mL/hr Intravenous Continuous     PRN Meds:    ALPRAZolam 0 5 mg Oral TID PRN   morphine injection 6 mg Intravenous Q3H PRN x5   ondansetron 4 mg Intravenous Q4H PRN x4       Network Utilization Review Department  [email or web address]  ATTENTION: Please call with any questions or concerns to [phone] and carefully listen to the prompts so that you are directed to the right person   All voicemails are confidential   Chaparrita Lee all requests for admission clinical reviews, approved or denied determinations and any other requests to dedicated fax number below belonging to the campus where the patient is receiving treatment   List of dedicated fax numbers for the Facilities:  [address] DENIALS (Administrative/Medical Necessity) [phone]   [address]  (Maternity/NICU/Pediatrics) [phone]   Skye Cannon [phone]   Rolo Gallegos [phone]   [place] [phone]   [address] [place] [address]  [phone]   [address] [facility] [address] [facility] [phone]   [address] [facility] [phone]   [address] [facility]  [address] [facility] [address] [facility] [phone]

## 2020-02-10 NOTE — UTILIZATION REVIEW
Notification of Inpatient Admission/Inpatient Authorization Request   This is a Notification of Inpatient Admission for [address]  Be advised that this patient was admitted to our facility under Inpatient Status  Contact Yesi Soto at [phone] for additional admission information  [facility] DEPT  DEDICATED [phone]  Patient Name:   Dany Segura   YOB: 1980       [address]   [address]:   [address]  Tax ID: [id number]  NPI: [id number] Attending Provider/NPI: Roseanna Dunn [[id number]]    Place of Service Code: 24     Place of Service Name:  [address] [place] [address]   Start Date: 2/9/20 0405     Discharge Date & Time: No discharge date for patient encounter  Type of Admission: Inpatient Status Discharge Disposition (if discharged): Final discharge disposition not confirmed   Patient Diagnoses: Vomiting [R11 10]     Orders: Admission Orders (From admission, onward)     Ordered        02/09/20 0405  Inpatient Admission (expected length of stay for this patient Order details is greater than two midnights)  Once                    Assigned Utilization Review Contact: Yesi Soto  Utilization   Network Utilization Review Department  Phone: [phone]; Fax [phone]  Email: [email or web address]   ATTENTION PAYERS: Please call the assigned Utilization  directly with any questions or concerns ALL voicemails in the department are confidential  Send all requests for admission clinical reviews, approved or denied determinations and any other requests to dedicated fax number belonging to the campus where the patient is receiving treatment

## 2020-02-10 NOTE — ANESTHESIA PREPROCEDURE EVALUATION
Review of Systems/Medical History  Patient summary reviewed  Chart reviewed  No history of anesthetic complications     Cardiovascular  Negative cardio ROS    Pulmonary  Negative pulmonary ROS Sleep apnea CPAP,        GI/Hepatic  Negative GI/hepatic ROS   GERD ,        Negative  ROS        Endo/Other  Negative endo/other ROS      GYN  Negative gynecology ROS          Hematology  Negative hematology ROS      Musculoskeletal  Negative musculoskeletal ROS        Neurology  Negative neurology ROS      Psychology   Negative psychology ROS Anxiety, Depression ,              Physical Exam    Airway    Mallampati score: II  TM Distance: >3 FB  Neck ROM: full     Dental       Cardiovascular  Comment: Negative ROS, Rhythm: regular, Rate: normal,     Pulmonary  Breath sounds clear to auscultation,     Other Findings        Anesthesia Plan  ASA Score- 3     Anesthesia Type- general with ASA Monitors  Additional Monitors:   Airway Plan: ETT  Plan Factors-    Induction- intravenous  Postoperative Plan- Plan for postoperative opioid use  Planned trial extubation    Informed Consent- Anesthetic plan and risks discussed with patient  I personally reviewed this patient with the CRNA  Discussed and agreed on the Anesthesia Plan with the CRNA  Arun Campos

## 2020-02-10 NOTE — H&P
H&P Exam - General Surgery   Tita Bonilla 46 y o  male MRN: [id number]  Unit/Bed#: -01 Encounter: [id number]    Assessment/Plan     Assessment:  Ventral hernia, partially incarcerated per imaging  Abdominal pain    Plan:  NPO  OR later today with Dr Jennifer Valenzuela for open ventral hernia repair with possible mesh  Dr Jennifer Valenzuela has obtained consent  Cefazolin 2g on call to [facility] on call to OR  Chlorhexadine wipes to entire body morning of surgery  IVF hydration  Medicate PRN pain/nausea  SCD's  Follow qAM CBC      History of Present Illness   HPI:  Tita Bonilla is a 46 y o  male who presents with complaints of diffuse abdominal pain as well as nausea and vomiting  He states that he has a known ventral hernia that has been present since undergoing a lap cholecystectomy in the past  He has was offered surgical fixation of the hernia in that past but opted to not proceed  At this time he states he is tired of the repetitive nature of the symptoms occurring and wishes to proceed with surgery  He denies fevers/chills  Denies chest pain or short of breath  He is passing gas and moving bowels  Review of Systems   Constitutional: Negative  HENT: Negative  Eyes: Negative  Respiratory: Negative  Cardiovascular: Negative  Endocrine: Negative  Genitourinary: Negative  Musculoskeletal: Negative  Skin: Negative  Allergic/Immunologic: Negative  Neurological: Negative  Hematological: Negative  Psychiatric/Behavioral: Negative  Historical Information   History reviewed  No pertinent past medical history  History reviewed  No pertinent surgical history    Social History   Social History     Substance and Sexual Activity   Alcohol Use Yes    Comment: rarely     Social History     Substance and Sexual Activity   Drug Use Not Currently     Social History     Tobacco Use   Smoking Status Never Smoker   Smokeless Tobacco Never Used     Family History: non-contributory    Meds/Allergies   all medications and allergies reviewed  No Known Allergies    Objective   First Vitals:   Blood Pressure: 144/93 (02/09/20 0019)  Pulse: 86 (02/09/20 0019)  Temperature: 97 9 °F (36 6 °C) (02/09/20 0019)  Temp Source: Oral (02/09/20 0019)  Respirations: 19 (02/09/20 0019)  Height: 6' (182 9 cm)(6 0 ft) (02/09/20 0504)  Weight - Scale: (!) 137 kg (302 lb 4 oz) (02/09/20 0019)  SpO2: 99 % (02/09/20 0019)    Current Vitals:   Blood Pressure: 131/84 (02/10/20 0754)  Pulse: 70 (02/10/20 0754)  Temperature: 97 9 °F (36 6 °C) (02/10/20 0754)  Temp Source: Oral (02/10/20 0754)  Respirations: 18 (02/10/20 0754)  Height: 6' (182 9 cm)(6 0 ft) (02/09/20 0504)  Weight - Scale: 133 kg (294 lb 1 5 oz) (02/09/20 0504)  SpO2: 97 % (02/10/20 0754)      Intake/Output Summary (Last 24 hours) at 2/10/2020 0817  Last data filed at 2/10/2020 0816  Gross per 24 hour   Intake 2125 ml   Output 4500 ml   Net -2375 ml       Invasive Devices     Peripheral Intravenous Line            Peripheral IV 02/09/20 Right Antecubital 1 day                Physical Exam   Constitutional: He is oriented to person, place, and time  He appears well-developed and well-nourished  No distress  HENT:   Head: Normocephalic and atraumatic  Mouth/Throat: Oropharynx is clear and moist    Eyes: Pupils are equal, round, and reactive to light  Conjunctivae and EOM are normal  Right eye exhibits no discharge  Left eye exhibits no discharge  Neck: Normal range of motion  Neck supple  No tracheal deviation present  Cardiovascular: Normal rate, regular rhythm, normal heart sounds and intact distal pulses  No murmur heard  Pulmonary/Chest: Effort normal and breath sounds normal  No respiratory distress  He has no wheezes  Abdominal: Soft  Bowel sounds are normal    Tender to palpation over periumbilical hernia that is not reducible, bowel sounds present, non distended  No guarding or rebound   Musculoskeletal: Normal range of motion   He exhibits no edema or deformity  Neurological: He is alert and oriented to person, place, and time  No cranial nerve deficit  Skin: Skin is warm and dry  Capillary refill takes less than 2 seconds  He is not diaphoretic  No erythema  Psychiatric: He has a normal mood and affect  His behavior is normal  Judgment and thought content normal    Nursing note and vitals reviewed  Lab Results: I have personally reviewed pertinent lab results  Imaging: I have personally reviewed pertinent reports  CT Abdomen/pelvis w contrast, 2/9/2020  Impression:       Supraventral  fat-containing hernia with herniated fat as well as underlying mesenteric/omental fat demonstrating increased congestion/edema consistent with strangulation  EKG, Pathology, and Other Studies: I have personally reviewed pertinent reports  Counseling / Coordination of Care  Total floor / unit time spent today 40 minutes  Greater than 50% of total time was spent with the patient and / or family counseling and / or coordination of care  A description of the counseling / coordination of care: lab and imaging review, physical exam, discussion of diagnosis and treatment options      Raymond Gonzalez PA-C

## 2020-02-10 NOTE — PLAN OF CARE
Problem: Potential for Falls  Goal: Patient will remain free of falls  Description  INTERVENTIONS:  - Assess patient frequently for physical needs  -  Identify cognitive and physical deficits and behaviors that affect risk of falls  -  Desha fall precautions as indicated by assessment   - Educate patient/family on patient safety including physical limitations  - Instruct patient to call for assistance with activity based on assessment  - Modify environment to reduce risk of injury  - Consider OT/PT consult to assist with strengthening/mobility  Outcome: Progressing     Problem: Nutrition/Hydration-ADULT  Goal: Nutrient/Hydration intake appropriate for improving, restoring or maintaining nutritional needs  Description  Monitor and assess patient's nutrition/hydration status for malnutrition  Collaborate with interdisciplinary team and initiate plan and interventions as ordered  Monitor patient's weight and dietary intake as ordered or per policy  Utilize nutrition screening tool and intervene as necessary  Determine patient's food preferences and provide high-protein, high-caloric foods as appropriate       INTERVENTIONS:  - Monitor oral intake, urinary output, labs, and treatment plans  - Assess nutrition and hydration status and recommend course of action  - Evaluate amount of meals eaten  - Assist patient with eating if necessary   - Allow adequate time for meals  - Recommend/ encourage appropriate diets, oral nutritional supplements, and vitamin/mineral supplements  - Order, calculate, and assess calorie counts as needed  - Recommend, monitor, and adjust tube feedings and TPN/PPN based on assessed needs  - Assess need for intravenous fluids  - Provide specific nutrition/hydration education as appropriate  - Include patient/family/caregiver in decisions related to nutrition  Outcome: Progressing

## 2020-02-10 NOTE — DISCHARGE INSTRUCTIONS
May shower and then keep area open to air  No lifting greater than 15 lbs for two weeks  Return to office in 2 weeks for recheck  Call if any increased pain, fevers, redness or drainage

## 2020-02-10 NOTE — OP NOTE
OPERATIVE REPORT  PATIENT NAME: Tita Bonilla    :  1980  MRN: [id number]  Pt Location: OW OR ROOM 02    SURGERY DATE: 2/10/2020    Surgeon(s) and Role:     * Aliza Andrews DO - Primary     * Amelie Diaz PA-C - Assisting    Preop Diagnosis:  Incarcerated ventral hernia [K43 6]    Post-Op Diagnosis Codes:     * Incarcerated ventral hernia [K43 6]    Procedure(s) (LRB):  REPAIR HERNIA VENTRAL (N/A)    Specimen(s):  ID Type Source Tests Collected by Time Destination   1 : ventral hernia sac Tissue Other TISSUE EXAM Aliza Andrews  2/10/2020 12:54 PM        Estimated Blood Loss:   Minimal    Drains:  * No LDAs found *    Anesthesia Type:   General    Operative Indications:  Incarcerated ventral hernia [K43 6]    Specimen antral hernia sac    Operative Findings:  Small supraumbilical ventral hernia with a large amount of omentum within the hernia sac  The defect measures approximately 1 cm  Complications:   None    Procedure and Technique:  The pt is seen preop and his questions were answered  The patient is placed under general endotracheal anesthesia and then a time-out was performed with all members of the team present  He is prepped and draped in a sterile manner using ChloraPrep to the abdomen  Local anesthetic 0 25% Marcaine with epinephrine is instilled in the supraumbilical area  An incision is made and then I dissected down to the hernia contents and hernia sac  The hernia sac was dissected free from the adjacent tissue and I opened the hernia sac and removed the sac and a portion of the omentum that was in the hernia sac  This was sent as specimen  The fascial edges were cleared  The fascial defect was only approximately 1 cm  The fascia was closed with figure-of-eight 0 Prolene suture  We irrigated with saline lavage and suction out the irrigants  The wound was then closed with 2 layers of 3 0 Vicryl suture in the 4 Vicryl suture for skin    Skin glue was used for dressing  Patient tolerated the procedure well the sponge needle sharp counts and instrument counts were correct x2 and the EBL was approximately 5 mL  Patient was extubated and transferred to recovery room and he is in satisfactory stable condition       I was present for the entire procedure and A physician assistant was required during the procedure for retraction tissue handling,dissection and suturing    Patient Disposition:  PACU     SIGNATURE: Neftali Sams DO  DATE: February 10, 2020  TIME: 1:16 PM

## 2020-02-12 NOTE — UTILIZATION REVIEW
Notification of Discharge  This is a Notification of Discharge from our facility [address]  Please be advised that this patient has been discharge from our facility  Below you will find the admission and discharge date and time including the patients disposition  PRESENTATION DATE: 2/9/2020 12:18 AM  OBS ADMISSION DATE:   IP ADMISSION DATE: 2/9/20 0405   DISCHARGE DATE: 2/10/2020  6:02 PM  DISPOSITION: Home/Self Care Home/Self Care   Admission Orders listed below:  Admission Orders (From admission, onward)     Ordered        02/09/20 0405  Inpatient Admission (expected length of stay for this patient Order details is greater than two midnights)  Once                   Please contact the UR Department if additional information is required to close this patient's authorization/case  [address] [facility] Util[facility] Review Department  Main: [phone] x carefully listen to the prompts  All voicemails are confidential   [email or web address]  Send all requests for admission clinical reviews, approved or denied determinations and any other requests to dedicated fax number below belonging to the campus where the patient is receiving treatment   List of dedicated fax numbers:  [address] DENIALS (Administrative/Medical Necessity) [phone]   [address] (Maternity/NICU/Pediatrics) [phone]   Jillian Painting [phone]   Ketan Gamboa [phone]   Dashawn Montanez [phone]   [facility] [address] [facility] [phone]   [facility]  [phone]   [address] [place] [address], [facility]  [address] [facility] [address] [facility] [phone]

## 2020-03-18 ENCOUNTER — HOSPITAL ENCOUNTER (EMERGENCY)
Facility: HOSPITAL | Age: 40
Discharge: HOME | End: 2020-03-18
Attending: EMERGENCY MEDICINE | Admitting: EMERGENCY MEDICINE
Payer: COMMERCIAL

## 2020-03-18 ENCOUNTER — APPOINTMENT (EMERGENCY)
Dept: RADIOLOGY | Facility: HOSPITAL | Age: 40
End: 2020-03-18
Payer: COMMERCIAL

## 2020-03-18 VITALS
OXYGEN SATURATION: 96 % | SYSTOLIC BLOOD PRESSURE: 121 MMHG | RESPIRATION RATE: 16 BRPM | DIASTOLIC BLOOD PRESSURE: 73 MMHG | WEIGHT: 279.76 LBS | BODY MASS INDEX: 37.89 KG/M2 | HEIGHT: 72 IN | HEART RATE: 56 BPM | TEMPERATURE: 98.4 F

## 2020-03-18 DIAGNOSIS — R07.89 ATYPICAL CHEST PAIN: Primary | ICD-10-CM

## 2020-03-18 LAB
ALBUMIN SERPL BCP-MCNC: 4.1 G/DL (ref 3.5–5)
ALP SERPL-CCNC: 135 U/L (ref 46–116)
ALT SERPL W P-5'-P-CCNC: 37 U/L (ref 12–78)
ANION GAP SERPL CALCULATED.3IONS-SCNC: 8 MMOL/L (ref 4–13)
AST SERPL W P-5'-P-CCNC: 15 U/L (ref 5–45)
BASOPHILS # BLD AUTO: 0.01 THOUSANDS/ΜL (ref 0–0.1)
BASOPHILS NFR BLD AUTO: 0 % (ref 0–1)
BILIRUB SERPL-MCNC: 0.58 MG/DL (ref 0.2–1)
BUN SERPL-MCNC: 10 MG/DL (ref 5–25)
CALCIUM SERPL-MCNC: 9.1 MG/DL (ref 8.3–10.1)
CHLORIDE SERPL-SCNC: 104 MMOL/L (ref 100–108)
CO2 SERPL-SCNC: 28 MMOL/L (ref 21–32)
CREAT SERPL-MCNC: 0.93 MG/DL (ref 0.6–1.3)
EOSINOPHIL # BLD AUTO: 0.41 THOUSAND/ΜL (ref 0–0.61)
EOSINOPHIL NFR BLD AUTO: 4 % (ref 0–6)
ERYTHROCYTE [DISTWIDTH] IN BLOOD BY AUTOMATED COUNT: 12.7 % (ref 11.6–15.1)
GFR SERPL CREATININE-BSD FRML MDRD: 104 ML/MIN/1.73SQ M
GLUCOSE SERPL-MCNC: 125 MG/DL (ref 65–140)
HCT VFR BLD AUTO: 44.2 % (ref 36.5–49.3)
HGB BLD-MCNC: 14.9 G/DL (ref 12–17)
IMM GRANULOCYTES # BLD AUTO: 0.04 THOUSAND/UL (ref 0–0.2)
IMM GRANULOCYTES NFR BLD AUTO: 0 % (ref 0–2)
LIPASE SERPL-CCNC: 62 U/L (ref 73–393)
LYMPHOCYTES # BLD AUTO: 1.78 THOUSANDS/ΜL (ref 0.6–4.47)
LYMPHOCYTES NFR BLD AUTO: 16 % (ref 14–44)
MCH RBC QN AUTO: 28.7 PG (ref 26.8–34.3)
MCHC RBC AUTO-ENTMCNC: 33.7 G/DL (ref 31.4–37.4)
MCV RBC AUTO: 85 FL (ref 82–98)
MONOCYTES # BLD AUTO: 0.69 THOUSAND/ΜL (ref 0.17–1.22)
MONOCYTES NFR BLD AUTO: 6 % (ref 4–12)
NEUTROPHILS # BLD AUTO: 8.51 THOUSANDS/ΜL (ref 1.85–7.62)
NEUTS SEG NFR BLD AUTO: 74 % (ref 43–75)
NRBC BLD AUTO-RTO: 0 /100 WBCS
PLATELET # BLD AUTO: 374 THOUSANDS/UL (ref 149–390)
PMV BLD AUTO: 9.8 FL (ref 8.9–12.7)
POTASSIUM SERPL-SCNC: 3.6 MMOL/L (ref 3.5–5.3)
PROT SERPL-MCNC: 7.6 G/DL (ref 6.4–8.2)
RBC # BLD AUTO: 5.2 MILLION/UL (ref 3.88–5.62)
SODIUM SERPL-SCNC: 140 MMOL/L (ref 136–145)
TROPONIN I SERPL-MCNC: <0.02 NG/ML
WBC # BLD AUTO: 11.44 THOUSAND/UL (ref 4.31–10.16)

## 2020-03-18 PROCEDURE — 99285 EMERGENCY DEPT VISIT HI MDM: CPT

## 2020-03-18 PROCEDURE — 85025 COMPLETE CBC W/AUTO DIFF WBC: CPT | Performed by: EMERGENCY MEDICINE

## 2020-03-18 PROCEDURE — 80053 COMPREHEN METABOLIC PANEL: CPT | Performed by: EMERGENCY MEDICINE

## 2020-03-18 PROCEDURE — 36415 COLL VENOUS BLD VENIPUNCTURE: CPT | Performed by: EMERGENCY MEDICINE

## 2020-03-18 PROCEDURE — 71046 X-RAY EXAM CHEST 2 VIEWS: CPT

## 2020-03-18 PROCEDURE — 84484 ASSAY OF TROPONIN QUANT: CPT | Performed by: EMERGENCY MEDICINE

## 2020-03-18 PROCEDURE — 83690 ASSAY OF LIPASE: CPT | Performed by: EMERGENCY MEDICINE

## 2020-03-18 PROCEDURE — 99285 EMERGENCY DEPT VISIT HI MDM: CPT | Performed by: EMERGENCY MEDICINE

## 2020-03-18 PROCEDURE — 93005 ELECTROCARDIOGRAM TRACING: CPT

## 2020-03-18 PROCEDURE — 96374 THER/PROPH/DIAG INJ IV PUSH: CPT

## 2020-03-18 RX ORDER — SODIUM CHLORIDE 9 MG/ML
3 INJECTION INTRAVENOUS AS NEEDED
Status: DISCONTINUED | OUTPATIENT
Start: 2020-03-18 | End: 2020-03-18 | Stop reason: HOSPADM

## 2020-03-18 RX ORDER — MAGNESIUM HYDROXIDE/ALUMINUM HYDROXICE/SIMETHICONE 120; 1200; 1200 MG/30ML; MG/30ML; MG/30ML
30 SUSPENSION ORAL ONCE
Status: COMPLETED | OUTPATIENT
Start: 2020-03-18 | End: 2020-03-18

## 2020-03-18 RX ORDER — METOCLOPRAMIDE HYDROCHLORIDE 5 MG/5ML
10 SOLUTION ORAL ONCE
Status: COMPLETED | OUTPATIENT
Start: 2020-03-18 | End: 2020-03-18

## 2020-03-18 RX ORDER — PANTOPRAZOLE SODIUM 40 MG/1
20 TABLET, DELAYED RELEASE ORAL 2 TIMES DAILY
COMMUNITY
End: 2022-05-31

## 2020-03-18 RX ORDER — LIDOCAINE HYDROCHLORIDE 20 MG/ML
15 SOLUTION OROPHARYNGEAL ONCE
Status: COMPLETED | OUTPATIENT
Start: 2020-03-18 | End: 2020-03-18

## 2020-03-18 RX ADMIN — MORPHINE SULFATE 2 MG: 2 INJECTION, SOLUTION INTRAMUSCULAR; INTRAVENOUS at 07:30

## 2020-03-18 RX ADMIN — ALUMINUM HYDROXIDE, MAGNESIUM HYDROXIDE, AND SIMETHICONE 30 ML: 200; 200; 20 SUSPENSION ORAL at 06:08

## 2020-03-18 RX ADMIN — METOCLOPRAMIDE HYDROCHLORIDE 10 MG: 5 SOLUTION ORAL at 06:08

## 2020-03-18 RX ADMIN — LIDOCAINE HYDROCHLORIDE 15 ML: 20 SOLUTION ORAL; TOPICAL at 06:08

## 2020-03-18 NOTE — ED PROVIDER NOTES
History  Chief Complaint   Patient presents with    Chest Pain     pt c/o onset continuous chest pain radiating to shoulderblades and nausea starting 3 hrs ago today w/diarrhea yesterday  denies vomiting/sob  pt took 2 tums w/o relief  recent hernia repair 3/10/20     Patient is a 18-year-old male presenting to the emergency department complaining of pressure to his mid chest that started approximately 3 hours ago and woke him from sleep, he attempted to take Tums at home which offered him no relief, he denies any diaphoresis, no shortness of breath, no coronary artery disease, patient had a stress test in 2018 that he reports was normal, recent ventral hernia repair on February 10th, states he has been recovering well from this, patient denies having any symptoms earlier in the day, he denies any injury or strenuous activity/heavy lifting          Prior to Admission Medications   Prescriptions Last Dose Informant Patient Reported? Taking? ALPRAZolam (XANAX) 0 25 mg tablet  Self Yes Yes   Sig: Take 0 25 mg by mouth 3 (three) times a day as needed for anxiety   pantoprazole (PROTONIX) 40 mg tablet 3/18/2020 at Unknown time Self Yes Yes   Sig: Take 40 mg by mouth daily   vilazodone (VIIBRYD) 20 mg tablet 3/18/2020 at Unknown time  Yes Yes   Sig: Take 20 mg by mouth daily      Facility-Administered Medications: None       Past Medical History:   Diagnosis Date    Anxiety     Depression     GERD (gastroesophageal reflux disease)     Sleep apnea        Past Surgical History:   Procedure Laterality Date    ANKLE FRACTURE SURGERY      CHOLECYSTECTOMY      HERNIA REPAIR      VENTRAL HERNIA REPAIR N/A 2/10/2020    Procedure: REPAIR HERNIA VENTRAL;  Surgeon: Raheem Arredondo DO;  Location:  MAIN OR;  Service: General       History reviewed  No pertinent family history  I have reviewed and agree with the history as documented      E-Cigarette/Vaping    E-Cigarette Use Never User      E-Cigarette/Vaping Substances Social History     Tobacco Use    Smoking status: Never Smoker    Smokeless tobacco: Never Used   Substance Use Topics    Alcohol use: Yes     Comment: rarely    Drug use: Not Currently       Review of Systems   Constitutional: Negative  HENT: Negative  Eyes: Negative  Respiratory: Negative  Cardiovascular: Positive for chest pain  Gastrointestinal: Negative  Endocrine: Negative  Genitourinary: Negative  Musculoskeletal: Negative  Skin: Negative  Allergic/Immunologic: Negative  Neurological: Negative  Hematological: Negative  Psychiatric/Behavioral: Negative  Physical Exam  Physical Exam   Constitutional: He is oriented to person, place, and time  Vital signs are normal  He appears well-developed and well-nourished  He is active  HENT:   Head: Normocephalic and atraumatic  Eyes: Pupils are equal, round, and reactive to light  Conjunctivae, EOM and lids are normal    Neck: Trachea normal and normal range of motion  Neck supple  Cardiovascular: Normal rate and regular rhythm  Pulmonary/Chest: Effort normal and breath sounds normal    Abdominal: Soft  Normal appearance and bowel sounds are normal    Musculoskeletal: Normal range of motion  Neurological: He is alert and oriented to person, place, and time  He has normal strength  No cranial nerve deficit or sensory deficit  Skin: Skin is warm and dry  Capillary refill takes less than 2 seconds  Psychiatric: He has a normal mood and affect   His behavior is normal  Thought content normal        Vital Signs  ED Triage Vitals [03/18/20 0553]   Temperature Pulse Respirations Blood Pressure SpO2   98 4 °F (36 9 °C) 67 18 151/91 96 %      Temp Source Heart Rate Source Patient Position - Orthostatic VS BP Location FiO2 (%)   Temporal Monitor Lying Right arm --      Pain Score       6           Vitals:    03/18/20 0630 03/18/20 0659 03/18/20 0700 03/18/20 0730   BP: 122/60 117/60 112/63 121/73   Pulse: (!) 51 59 57 56   Patient Position - Orthostatic VS: Lying Lying                 ED Medications  Medications   aluminum-magnesium hydroxide-simethicone (MYLANTA) 200-200-20 mg/5 mL oral suspension 30 mL (30 mL Oral Given 3/18/20 0608)   Lidocaine Viscous HCl (XYLOCAINE) 2 % mucosal solution 15 mL (15 mL Swish & Spit Given 3/18/20 0608)   metoclopramide (REGLAN) oral solution 10 mg (10 mg Oral Given 3/18/20 6634)   morphine injection 2 mg (2 mg Intravenous Given 3/18/20 5866)       Diagnostic Studies  Results Reviewed     Procedure Component Value Units Date/Time    Troponin I [[id number]]  (Normal) Collected:  03/18/20 0602    Lab Status:  Final result Specimen:  Blood from Arm, Left Updated:  03/18/20 0627     Troponin I <0 02 ng/mL     Comprehensive metabolic panel [[id number]]  (Abnormal) Collected:  03/18/20 0602    Lab Status:  Final result Specimen:  Blood from Arm, Left Updated:  03/18/20 5215     Sodium 140 mmol/L      Potassium 3 6 mmol/L      Chloride 104 mmol/L      CO2 28 mmol/L      ANION GAP 8 mmol/L      BUN 10 mg/dL      Creatinine 0 93 mg/dL      Glucose 125 mg/dL      Calcium 9 1 mg/dL      AST 15 U/L      ALT 37 U/L      Alkaline Phosphatase 135 U/L      Total Protein 7 6 g/dL      Albumin 4 1 g/dL      Total Bilirubin 0 58 mg/dL      eGFR 104 ml/min/1 73sq m     Narrative:       [place] guidelines for Chronic Kidney Disease (CKD):     Stage 1 with normal or high GFR (GFR > 90 mL/min/1 73 square meters)    Stage 2 Mild CKD (GFR = 60-89 mL/min/1 73 square meters)    Stage 3A Moderate CKD (GFR = 45-59 mL/min/1 73 square meters)    Stage 3B Moderate CKD (GFR = 30-44 mL/min/1 73 square meters)    Stage 4 Severe CKD (GFR = 15-29 mL/min/1 73 square meters)    Stage 5 End Stage CKD (GFR <15 mL/min/1 73 square meters)  Note: GFR calculation is accurate only with a steady state creatinine    Lipase [[id number]]  (Abnormal) Collected:  03/18/20 0602    Lab Status:  Final result Specimen: Blood from Arm, Left Updated:  03/18/20 0622     Lipase 62 u/L     CBC and differential [[id number]]  (Abnormal) Collected:  03/18/20 0602    Lab Status:  Final result Specimen:  Blood from Arm, Left Updated:  03/18/20 0609     WBC 11 44 Thousand/uL      RBC 5 20 Million/uL      Hemoglobin 14 9 g/dL      Hematocrit 44 2 %      MCV 85 fL      MCH 28 7 pg      MCHC 33 7 g/dL      RDW 12 7 %      MPV 9 8 fL      Platelets 514 Thousands/uL      nRBC 0 /100 WBCs      Neutrophils Relative 74 %      Immat GRANS % 0 %      Lymphocytes Relative 16 %      Monocytes Relative 6 %      Eosinophils Relative 4 %      Basophils Relative 0 %      Neutrophils Absolute 8 51 Thousands/µL      Immature Grans Absolute 0 04 Thousand/uL      Lymphocytes Absolute 1 78 Thousands/µL      Monocytes Absolute 0 69 Thousand/µL      Eosinophils Absolute 0 41 Thousand/µL      Basophils Absolute 0 01 Thousands/µL                  X-ray chest 2 views   Final Result by Ivonne Mensah DO (03/18 7810)   No active pulmonary disease on examination which is somewhat limited secondary to low lung volumes              Workstation performed: [id number]                    Procedures  ECG 12 Lead Documentation Only  Date/Time: 3/18/2020 5:57 AM  Performed by: Tanna Mitchell DO  Authorized by: Tanna Mitchell DO     Indications / Diagnosis:  Chest pain  ECG reviewed by me, the ED Provider: yes    Patient location:  ED  Previous ECG:     Comparison to cardiac monitor: Yes    Interpretation:     Interpretation: non-specific    Rate:     ECG rate:  52    ECG rate assessment: bradycardic    Rhythm:     Rhythm: sinus rhythm    Ectopy:     Ectopy: none    QRS:     QRS intervals:  Normal  Conduction:     Conduction: normal    ST segments:     ST segments:  Normal  T waves:     T waves: normal               ED Course  ED Course as of Mar 18 1906   Wed Mar 18, 2020   0737 Patient reports feeling much better, lab and imaging findings discussed with patient and spouse at bedside which are remarkable for mild leukocytosis only without any other symptoms of infection evident, he does report feeling relief of symptoms after medications in the department, states that he was supposed to follow-up with gastroenterology to have an EGD done but he has not yet done this, he is currently taking Protonix at home, was advised to continue doing so and follow up with gastroenterologist within the next week, return to the emergency department if symptoms worsen, patient and spouse acknowledged understanding and agreement with this plan                                    Disposition  Final diagnoses:   Atypical chest pain     Time reflects when diagnosis was documented in both MDM as applicable and the Disposition within this note     Time User Action Codes Description Comment    3/18/2020  7:38 AM David Gunderson Add [R07 89] Atypical chest pain       ED Disposition     ED Disposition Condition Date/Time Comment    Discharge Stable Wed Mar 18, 2020  7:38 AM Hua Montoya discharge to home/self care  Follow-up Information     Follow up With Specialties Details Why Christy Alejo 54, DO Family Medicine In 3 days  [address] [place] [address]  [phone]            Discharge Medication List as of 3/18/2020  7:39 AM      CONTINUE these medications which have NOT CHANGED    Details   ALPRAZolam (XANAX) 0 25 mg tablet Take 0 25 mg by mouth 3 (three) times a day as needed for anxiety, Historical Med      pantoprazole (PROTONIX) 40 mg tablet Take 40 mg by mouth daily, Historical Med      vilazodone (VIIBRYD) 20 mg tablet Take 20 mg by mouth daily, Starting Wed 2/5/2020, Historical Med           No discharge procedures on file      PDMP Review     None          ED Provider  Electronically Signed by           Brendon Louis DO  03/18/20 6490

## 2020-03-19 LAB
ATRIAL RATE: 52 BPM
P AXIS: -16 DEGREES
PR INTERVAL: 152 MS
QRS AXIS: 43 DEGREES
QRSD INTERVAL: 106 MS
QT INTERVAL: 418 MS
QTC INTERVAL: 388 MS
T WAVE AXIS: 6 DEGREES
VENTRICULAR RATE: 52 BPM

## 2020-03-19 PROCEDURE — 93010 ELECTROCARDIOGRAM REPORT: CPT | Performed by: INTERNAL MEDICINE

## 2021-11-04 RX ORDER — GABAPENTIN 300 MG/1
300 CAPSULE ORAL 3 TIMES DAILY
COMMUNITY

## 2021-11-04 RX ORDER — LORAZEPAM 0.5 MG/1
TABLET ORAL EVERY 6 HOURS PRN
COMMUNITY

## 2021-11-04 RX ORDER — MELATONIN
2000 DAILY
COMMUNITY

## 2021-11-04 RX ORDER — ONDANSETRON 4 MG/1
4 TABLET, FILM COATED ORAL EVERY 8 HOURS PRN
COMMUNITY
End: 2022-05-31

## 2021-11-04 RX ORDER — TRAMADOL HYDROCHLORIDE 50 MG/1
50 TABLET ORAL EVERY 6 HOURS PRN
COMMUNITY

## 2021-11-04 RX ORDER — ACETAMINOPHEN 500 MG
500 TABLET ORAL EVERY 6 HOURS PRN
COMMUNITY

## 2021-11-04 RX ORDER — IBUPROFEN 400 MG/1
TABLET ORAL EVERY 6 HOURS PRN
COMMUNITY

## 2021-11-04 RX ORDER — HYOSCYAMINE SULFATE 0.12 MG/1
TABLET SUBLINGUAL AS NEEDED
COMMUNITY

## 2021-11-08 ENCOUNTER — APPOINTMENT (EMERGENCY)
Dept: CT IMAGING | Facility: HOSPITAL | Age: 41
DRG: 988 | End: 2021-11-08
Payer: COMMERCIAL

## 2021-11-08 ENCOUNTER — APPOINTMENT (EMERGENCY)
Dept: RADIOLOGY | Facility: HOSPITAL | Age: 41
DRG: 988 | End: 2021-11-08
Payer: COMMERCIAL

## 2021-11-08 ENCOUNTER — HOSPITAL ENCOUNTER (OUTPATIENT)
Facility: HOSPITAL | Age: 41
Setting detail: OUTPATIENT SURGERY
Discharge: HOME | DRG: 988 | End: 2021-11-08
Attending: SURGERY | Admitting: SURGERY
Payer: COMMERCIAL

## 2021-11-08 ENCOUNTER — HOSPITAL ENCOUNTER (INPATIENT)
Facility: HOSPITAL | Age: 41
LOS: 4 days | Discharge: HOME | DRG: 988 | End: 2021-11-12
Attending: EMERGENCY MEDICINE | Admitting: SURGERY
Payer: COMMERCIAL

## 2021-11-08 ENCOUNTER — ANESTHESIA (OUTPATIENT)
Dept: PERIOP | Facility: HOSPITAL | Age: 41
DRG: 988 | End: 2021-11-08
Payer: COMMERCIAL

## 2021-11-08 ENCOUNTER — ANESTHESIA EVENT (OUTPATIENT)
Dept: PERIOP | Facility: HOSPITAL | Age: 41
DRG: 988 | End: 2021-11-08
Payer: COMMERCIAL

## 2021-11-08 VITALS
BODY MASS INDEX: 37.65 KG/M2 | RESPIRATION RATE: 18 BRPM | DIASTOLIC BLOOD PRESSURE: 78 MMHG | HEART RATE: 72 BPM | SYSTOLIC BLOOD PRESSURE: 136 MMHG | TEMPERATURE: 97.7 F | WEIGHT: 278 LBS | HEIGHT: 72 IN | OXYGEN SATURATION: 96 %

## 2021-11-08 DIAGNOSIS — K91.89 POSTOPERATIVE ILEUS (HCC): ICD-10-CM

## 2021-11-08 DIAGNOSIS — K90.0 CELIAC DISEASE: ICD-10-CM

## 2021-11-08 DIAGNOSIS — E66.01 OBESITIES, MORBID (HCC): ICD-10-CM

## 2021-11-08 DIAGNOSIS — K56.7 POSTOPERATIVE ILEUS (HCC): ICD-10-CM

## 2021-11-08 DIAGNOSIS — K43.9 VENTRAL HERNIA WITHOUT OBSTRUCTION OR GANGRENE: ICD-10-CM

## 2021-11-08 DIAGNOSIS — R55 SYNCOPE: Primary | ICD-10-CM

## 2021-11-08 DIAGNOSIS — K43.6 INCARCERATED VENTRAL HERNIA: ICD-10-CM

## 2021-11-08 LAB
2HR DELTA HS TROPONIN: 0 NG/L
4HR DELTA HS TROPONIN: 0 NG/L
ALBUMIN SERPL BCP-MCNC: 3.5 G/DL (ref 3.5–5)
ALP SERPL-CCNC: 109 U/L (ref 46–116)
ALT SERPL W P-5'-P-CCNC: 31 U/L (ref 12–78)
ANION GAP SERPL CALCULATED.3IONS-SCNC: 8 MMOL/L (ref 4–13)
AST SERPL W P-5'-P-CCNC: 14 U/L (ref 5–45)
BASOPHILS # BLD MANUAL: 0 THOUSAND/UL (ref 0–0.1)
BASOPHILS NFR MAR MANUAL: 0 % (ref 0–1)
BILIRUB SERPL-MCNC: 0.66 MG/DL (ref 0.2–1)
BUN SERPL-MCNC: 11 MG/DL (ref 5–25)
CALCIUM SERPL-MCNC: 8.3 MG/DL (ref 8.3–10.1)
CARDIAC TROPONIN I PNL SERPL HS: 3 NG/L
CHLORIDE SERPL-SCNC: 105 MMOL/L (ref 100–108)
CO2 SERPL-SCNC: 26 MMOL/L (ref 21–32)
CREAT SERPL-MCNC: 1.05 MG/DL (ref 0.6–1.3)
EOSINOPHIL # BLD MANUAL: 0 THOUSAND/UL (ref 0–0.4)
EOSINOPHIL NFR BLD MANUAL: 0 % (ref 0–6)
ERYTHROCYTE [DISTWIDTH] IN BLOOD BY AUTOMATED COUNT: 12.6 % (ref 11.6–15.1)
GFR SERPL CREATININE-BSD FRML MDRD: 88 ML/MIN/1.73SQ M
GLUCOSE SERPL-MCNC: 138 MG/DL (ref 65–140)
GLUCOSE SERPL-MCNC: 141 MG/DL (ref 65–140)
GLUCOSE SERPL-MCNC: 145 MG/DL (ref 65–140)
GLUCOSE SERPL-MCNC: 145 MG/DL (ref 65–140)
HCT VFR BLD AUTO: 37.4 % (ref 36.5–49.3)
HGB BLD-MCNC: 12.7 G/DL (ref 12–17)
LACTATE SERPL-SCNC: 0.7 MMOL/L (ref 0.5–2)
LIPASE SERPL-CCNC: 86 U/L (ref 73–393)
LYMPHOCYTES # BLD AUTO: 0.16 THOUSAND/UL (ref 0.6–4.47)
LYMPHOCYTES # BLD AUTO: 1 % (ref 14–44)
MCH RBC QN AUTO: 28.9 PG (ref 26.8–34.3)
MCHC RBC AUTO-ENTMCNC: 34 G/DL (ref 31.4–37.4)
MCV RBC AUTO: 85 FL (ref 82–98)
MONOCYTES # BLD AUTO: 0.32 THOUSAND/UL (ref 0–1.22)
MONOCYTES NFR BLD: 2 % (ref 4–12)
NEUTROPHILS # BLD MANUAL: 15.31 THOUSAND/UL (ref 1.85–7.62)
NEUTS SEG NFR BLD AUTO: 97 % (ref 43–75)
PLATELET # BLD AUTO: 359 THOUSANDS/UL (ref 149–390)
PLATELET BLD QL SMEAR: ADEQUATE
PMV BLD AUTO: 10.2 FL (ref 8.9–12.7)
POTASSIUM SERPL-SCNC: 4 MMOL/L (ref 3.5–5.3)
PROT SERPL-MCNC: 6.4 G/DL (ref 6.4–8.2)
RBC # BLD AUTO: 4.39 MILLION/UL (ref 3.88–5.62)
RBC MORPH BLD: NORMAL
SODIUM SERPL-SCNC: 139 MMOL/L (ref 136–145)
WBC # BLD AUTO: 15.78 THOUSAND/UL (ref 4.31–10.16)

## 2021-11-08 PROCEDURE — 85027 COMPLETE CBC AUTOMATED: CPT | Performed by: PHYSICIAN ASSISTANT

## 2021-11-08 PROCEDURE — 99285 EMERGENCY DEPT VISIT HI MDM: CPT | Performed by: PHYSICIAN ASSISTANT

## 2021-11-08 PROCEDURE — 83690 ASSAY OF LIPASE: CPT | Performed by: PHYSICIAN ASSISTANT

## 2021-11-08 PROCEDURE — 84484 ASSAY OF TROPONIN QUANT: CPT | Performed by: PHYSICIAN ASSISTANT

## 2021-11-08 PROCEDURE — 99285 EMERGENCY DEPT VISIT HI MDM: CPT

## 2021-11-08 PROCEDURE — 88302 TISSUE EXAM BY PATHOLOGIST: CPT | Performed by: PATHOLOGY

## 2021-11-08 PROCEDURE — 96374 THER/PROPH/DIAG INJ IV PUSH: CPT

## 2021-11-08 PROCEDURE — 83605 ASSAY OF LACTIC ACID: CPT | Performed by: PHYSICIAN ASSISTANT

## 2021-11-08 PROCEDURE — 82948 REAGENT STRIP/BLOOD GLUCOSE: CPT

## 2021-11-08 PROCEDURE — 74022 RADEX COMPL AQT ABD SERIES: CPT

## 2021-11-08 PROCEDURE — 36415 COLL VENOUS BLD VENIPUNCTURE: CPT | Performed by: PHYSICIAN ASSISTANT

## 2021-11-08 PROCEDURE — 96375 TX/PRO/DX INJ NEW DRUG ADDON: CPT

## 2021-11-08 PROCEDURE — 85007 BL SMEAR W/DIFF WBC COUNT: CPT | Performed by: PHYSICIAN ASSISTANT

## 2021-11-08 PROCEDURE — 80053 COMPREHEN METABOLIC PANEL: CPT | Performed by: PHYSICIAN ASSISTANT

## 2021-11-08 PROCEDURE — 0WQF0ZZ REPAIR ABDOMINAL WALL, OPEN APPROACH: ICD-10-PCS | Performed by: SURGERY

## 2021-11-08 PROCEDURE — 93005 ELECTROCARDIOGRAM TRACING: CPT

## 2021-11-08 PROCEDURE — 70450 CT HEAD/BRAIN W/O DYE: CPT

## 2021-11-08 PROCEDURE — 99252 IP/OBS CONSLTJ NEW/EST SF 35: CPT | Performed by: NURSE PRACTITIONER

## 2021-11-08 RX ORDER — MIDAZOLAM HYDROCHLORIDE 2 MG/2ML
INJECTION, SOLUTION INTRAMUSCULAR; INTRAVENOUS AS NEEDED
Status: DISCONTINUED | OUTPATIENT
Start: 2021-11-08 | End: 2021-11-08

## 2021-11-08 RX ORDER — FENTANYL CITRATE 50 UG/ML
50 INJECTION, SOLUTION INTRAMUSCULAR; INTRAVENOUS ONCE
Status: COMPLETED | OUTPATIENT
Start: 2021-11-08 | End: 2021-11-08

## 2021-11-08 RX ORDER — HYDROMORPHONE HCL IN WATER/PF 6 MG/30 ML
0.2 PATIENT CONTROLLED ANALGESIA SYRINGE INTRAVENOUS
Status: DISCONTINUED | OUTPATIENT
Start: 2021-11-08 | End: 2021-11-08 | Stop reason: HOSPADM

## 2021-11-08 RX ORDER — SUCCINYLCHOLINE/SOD CL,ISO/PF 100 MG/5ML
SYRINGE (ML) INTRAVENOUS AS NEEDED
Status: DISCONTINUED | OUTPATIENT
Start: 2021-11-08 | End: 2021-11-08

## 2021-11-08 RX ORDER — PROPOFOL 10 MG/ML
INJECTION, EMULSION INTRAVENOUS AS NEEDED
Status: DISCONTINUED | OUTPATIENT
Start: 2021-11-08 | End: 2021-11-08

## 2021-11-08 RX ORDER — MORPHINE SULFATE 4 MG/ML
4 INJECTION, SOLUTION INTRAMUSCULAR; INTRAVENOUS ONCE
Status: COMPLETED | OUTPATIENT
Start: 2021-11-08 | End: 2021-11-08

## 2021-11-08 RX ORDER — ROCURONIUM BROMIDE 10 MG/ML
INJECTION, SOLUTION INTRAVENOUS AS NEEDED
Status: DISCONTINUED | OUTPATIENT
Start: 2021-11-08 | End: 2021-11-08

## 2021-11-08 RX ORDER — ONDANSETRON 2 MG/ML
4 INJECTION INTRAMUSCULAR; INTRAVENOUS ONCE AS NEEDED
Status: DISCONTINUED | OUTPATIENT
Start: 2021-11-08 | End: 2021-11-08 | Stop reason: HOSPADM

## 2021-11-08 RX ORDER — KETOROLAC TROMETHAMINE 30 MG/ML
15 INJECTION, SOLUTION INTRAMUSCULAR; INTRAVENOUS EVERY 6 HOURS PRN
Status: DISPENSED | OUTPATIENT
Start: 2021-11-08 | End: 2021-11-10

## 2021-11-08 RX ORDER — ONDANSETRON 2 MG/ML
INJECTION INTRAMUSCULAR; INTRAVENOUS AS NEEDED
Status: DISCONTINUED | OUTPATIENT
Start: 2021-11-08 | End: 2021-11-08

## 2021-11-08 RX ORDER — FENTANYL CITRATE/PF 50 MCG/ML
25 SYRINGE (ML) INJECTION
Status: DISCONTINUED | OUTPATIENT
Start: 2021-11-08 | End: 2021-11-08 | Stop reason: HOSPADM

## 2021-11-08 RX ORDER — SODIUM CHLORIDE 9 MG/ML
100 INJECTION, SOLUTION INTRAVENOUS CONTINUOUS
Status: DISCONTINUED | OUTPATIENT
Start: 2021-11-08 | End: 2021-11-12

## 2021-11-08 RX ORDER — BUPIVACAINE HYDROCHLORIDE 2.5 MG/ML
INJECTION, SOLUTION EPIDURAL; INFILTRATION; INTRACAUDAL AS NEEDED
Status: DISCONTINUED | OUTPATIENT
Start: 2021-11-08 | End: 2021-11-08 | Stop reason: HOSPADM

## 2021-11-08 RX ORDER — SODIUM CHLORIDE, SODIUM LACTATE, POTASSIUM CHLORIDE, CALCIUM CHLORIDE 600; 310; 30; 20 MG/100ML; MG/100ML; MG/100ML; MG/100ML
INJECTION, SOLUTION INTRAVENOUS CONTINUOUS PRN
Status: DISCONTINUED | OUTPATIENT
Start: 2021-11-08 | End: 2021-11-08

## 2021-11-08 RX ORDER — LORAZEPAM 2 MG/ML
0.5 INJECTION INTRAMUSCULAR ONCE
Status: COMPLETED | OUTPATIENT
Start: 2021-11-08 | End: 2021-11-09

## 2021-11-08 RX ORDER — FENTANYL CITRATE 50 UG/ML
INJECTION, SOLUTION INTRAMUSCULAR; INTRAVENOUS AS NEEDED
Status: DISCONTINUED | OUTPATIENT
Start: 2021-11-08 | End: 2021-11-08

## 2021-11-08 RX ORDER — MAGNESIUM HYDROXIDE 1200 MG/15ML
LIQUID ORAL AS NEEDED
Status: DISCONTINUED | OUTPATIENT
Start: 2021-11-08 | End: 2021-11-08 | Stop reason: HOSPADM

## 2021-11-08 RX ORDER — DEXAMETHASONE SODIUM PHOSPHATE 4 MG/ML
INJECTION, SOLUTION INTRA-ARTICULAR; INTRALESIONAL; INTRAMUSCULAR; INTRAVENOUS; SOFT TISSUE AS NEEDED
Status: DISCONTINUED | OUTPATIENT
Start: 2021-11-08 | End: 2021-11-08

## 2021-11-08 RX ORDER — ONDANSETRON 2 MG/ML
4 INJECTION INTRAMUSCULAR; INTRAVENOUS ONCE
Status: COMPLETED | OUTPATIENT
Start: 2021-11-08 | End: 2021-11-08

## 2021-11-08 RX ORDER — KETOROLAC TROMETHAMINE 30 MG/ML
INJECTION, SOLUTION INTRAMUSCULAR; INTRAVENOUS AS NEEDED
Status: DISCONTINUED | OUTPATIENT
Start: 2021-11-08 | End: 2021-11-08

## 2021-11-08 RX ORDER — ONDANSETRON 2 MG/ML
4 INJECTION INTRAMUSCULAR; INTRAVENOUS EVERY 6 HOURS PRN
Status: DISCONTINUED | OUTPATIENT
Start: 2021-11-08 | End: 2021-11-12 | Stop reason: HOSPADM

## 2021-11-08 RX ORDER — SODIUM CHLORIDE, SODIUM LACTATE, POTASSIUM CHLORIDE, CALCIUM CHLORIDE 600; 310; 30; 20 MG/100ML; MG/100ML; MG/100ML; MG/100ML
20 INJECTION, SOLUTION INTRAVENOUS CONTINUOUS
Status: DISCONTINUED | OUTPATIENT
Start: 2021-11-08 | End: 2021-11-08 | Stop reason: HOSPADM

## 2021-11-08 RX ADMIN — SUGAMMADEX 200 MG: 100 INJECTION, SOLUTION INTRAVENOUS at 09:58

## 2021-11-08 RX ADMIN — PROPOFOL 200 MG: 10 INJECTION, EMULSION INTRAVENOUS at 09:13

## 2021-11-08 RX ADMIN — ROCURONIUM BROMIDE 30 MG: 10 INJECTION, SOLUTION INTRAVENOUS at 09:18

## 2021-11-08 RX ADMIN — MIDAZOLAM HYDROCHLORIDE 2 MG: 1 INJECTION, SOLUTION INTRAMUSCULAR; INTRAVENOUS at 09:08

## 2021-11-08 RX ADMIN — MORPHINE SULFATE 4 MG: 4 INJECTION INTRAVENOUS at 19:06

## 2021-11-08 RX ADMIN — SODIUM CHLORIDE 100 ML/HR: 0.9 INJECTION, SOLUTION INTRAVENOUS at 22:39

## 2021-11-08 RX ADMIN — FENTANYL CITRATE 100 MCG: 50 INJECTION, SOLUTION INTRAMUSCULAR; INTRAVENOUS at 09:13

## 2021-11-08 RX ADMIN — ONDANSETRON 4 MG: 2 INJECTION INTRAMUSCULAR; INTRAVENOUS at 09:08

## 2021-11-08 RX ADMIN — LIDOCAINE HYDROCHLORIDE 50 MG: 20 INJECTION, SOLUTION INTRAVENOUS at 09:13

## 2021-11-08 RX ADMIN — ONDANSETRON 4 MG: 2 INJECTION INTRAMUSCULAR; INTRAVENOUS at 19:06

## 2021-11-08 RX ADMIN — FENTANYL CITRATE 50 MCG: 0.05 INJECTION, SOLUTION INTRAMUSCULAR; INTRAVENOUS at 21:05

## 2021-11-08 RX ADMIN — SODIUM CHLORIDE, SODIUM LACTATE, POTASSIUM CHLORIDE, AND CALCIUM CHLORIDE: .6; .31; .03; .02 INJECTION, SOLUTION INTRAVENOUS at 09:08

## 2021-11-08 RX ADMIN — KETOROLAC TROMETHAMINE 30 MG: 30 INJECTION, SOLUTION INTRAMUSCULAR at 09:39

## 2021-11-08 RX ADMIN — Medication 160 MG: at 09:13

## 2021-11-08 RX ADMIN — DEXAMETHASONE SODIUM PHOSPHATE 4 MG: 4 INJECTION, SOLUTION INTRAMUSCULAR; INTRAVENOUS at 09:08

## 2021-11-08 RX ADMIN — SODIUM CHLORIDE, SODIUM LACTATE, POTASSIUM CHLORIDE, AND CALCIUM CHLORIDE: .6; .31; .03; .02 INJECTION, SOLUTION INTRAVENOUS at 09:44

## 2021-11-09 ENCOUNTER — APPOINTMENT (INPATIENT)
Dept: NON INVASIVE DIAGNOSTICS | Facility: HOSPITAL | Age: 41
DRG: 988 | End: 2021-11-09
Payer: COMMERCIAL

## 2021-11-09 ENCOUNTER — APPOINTMENT (INPATIENT)
Dept: NEUROLOGY | Facility: HOSPITAL | Age: 41
DRG: 988 | End: 2021-11-09
Attending: STUDENT IN AN ORGANIZED HEALTH CARE EDUCATION/TRAINING PROGRAM
Payer: COMMERCIAL

## 2021-11-09 PROBLEM — G47.33 OSA (OBSTRUCTIVE SLEEP APNEA): Status: ACTIVE | Noted: 2021-11-09

## 2021-11-09 LAB
ANION GAP SERPL CALCULATED.3IONS-SCNC: 6 MMOL/L (ref 4–13)
ATRIAL RATE: 50 BPM
ATRIAL RATE: 52 BPM
BASOPHILS # BLD AUTO: 0.02 THOUSANDS/ΜL (ref 0–0.1)
BASOPHILS NFR BLD AUTO: 0 % (ref 0–1)
BUN SERPL-MCNC: 11 MG/DL (ref 5–25)
CALCIUM SERPL-MCNC: 7.5 MG/DL (ref 8.3–10.1)
CHLORIDE SERPL-SCNC: 107 MMOL/L (ref 100–108)
CO2 SERPL-SCNC: 28 MMOL/L (ref 21–32)
CREAT SERPL-MCNC: 0.97 MG/DL (ref 0.6–1.3)
EOSINOPHIL # BLD AUTO: 0 THOUSAND/ΜL (ref 0–0.61)
EOSINOPHIL NFR BLD AUTO: 0 % (ref 0–6)
ERYTHROCYTE [DISTWIDTH] IN BLOOD BY AUTOMATED COUNT: 12.9 % (ref 11.6–15.1)
GFR SERPL CREATININE-BSD FRML MDRD: 97 ML/MIN/1.73SQ M
GLUCOSE SERPL-MCNC: 118 MG/DL (ref 65–140)
HCT VFR BLD AUTO: 32.8 % (ref 36.5–49.3)
HGB BLD-MCNC: 10.8 G/DL (ref 12–17)
IMM GRANULOCYTES # BLD AUTO: 0.07 THOUSAND/UL (ref 0–0.2)
IMM GRANULOCYTES NFR BLD AUTO: 1 % (ref 0–2)
LYMPHOCYTES # BLD AUTO: 1.29 THOUSANDS/ΜL (ref 0.6–4.47)
LYMPHOCYTES NFR BLD AUTO: 9 % (ref 14–44)
MAGNESIUM SERPL-MCNC: 2.1 MG/DL (ref 1.6–2.6)
MCH RBC QN AUTO: 28.7 PG (ref 26.8–34.3)
MCHC RBC AUTO-ENTMCNC: 32.9 G/DL (ref 31.4–37.4)
MCV RBC AUTO: 87 FL (ref 82–98)
MONOCYTES # BLD AUTO: 0.76 THOUSAND/ΜL (ref 0.17–1.22)
MONOCYTES NFR BLD AUTO: 5 % (ref 4–12)
NEUTROPHILS # BLD AUTO: 13.07 THOUSANDS/ΜL (ref 1.85–7.62)
NEUTS SEG NFR BLD AUTO: 85 % (ref 43–75)
NRBC BLD AUTO-RTO: 0 /100 WBCS
P AXIS: 36 DEGREES
PLATELET # BLD AUTO: 311 THOUSANDS/UL (ref 149–390)
PMV BLD AUTO: 10.2 FL (ref 8.9–12.7)
POTASSIUM SERPL-SCNC: 4.3 MMOL/L (ref 3.5–5.3)
PR INTERVAL: 130 MS
QRS AXIS: 58 DEGREES
QRS AXIS: 59 DEGREES
QRSD INTERVAL: 100 MS
QRSD INTERVAL: 104 MS
QT INTERVAL: 412 MS
QT INTERVAL: 418 MS
QTC INTERVAL: 379 MS
QTC INTERVAL: 388 MS
RBC # BLD AUTO: 3.76 MILLION/UL (ref 3.88–5.62)
SODIUM SERPL-SCNC: 141 MMOL/L (ref 136–145)
T WAVE AXIS: 35 DEGREES
T WAVE AXIS: 46 DEGREES
VENTRICULAR RATE: 51 BPM
VENTRICULAR RATE: 52 BPM
WBC # BLD AUTO: 15.21 THOUSAND/UL (ref 4.31–10.16)

## 2021-11-09 PROCEDURE — 93010 ELECTROCARDIOGRAM REPORT: CPT | Performed by: INTERNAL MEDICINE

## 2021-11-09 PROCEDURE — 99222 1ST HOSP IP/OBS MODERATE 55: CPT | Performed by: INTERNAL MEDICINE

## 2021-11-09 PROCEDURE — 93306 TTE W/DOPPLER COMPLETE: CPT

## 2021-11-09 PROCEDURE — 95816 EEG AWAKE AND DROWSY: CPT

## 2021-11-09 PROCEDURE — NC001 PR NO CHARGE

## 2021-11-09 PROCEDURE — 99232 SBSQ HOSP IP/OBS MODERATE 35: CPT | Performed by: STUDENT IN AN ORGANIZED HEALTH CARE EDUCATION/TRAINING PROGRAM

## 2021-11-09 PROCEDURE — 94760 N-INVAS EAR/PLS OXIMETRY 1: CPT

## 2021-11-09 PROCEDURE — 85025 COMPLETE CBC W/AUTO DIFF WBC: CPT | Performed by: SURGERY

## 2021-11-09 PROCEDURE — 36415 COLL VENOUS BLD VENIPUNCTURE: CPT | Performed by: SURGERY

## 2021-11-09 PROCEDURE — 80048 BASIC METABOLIC PNL TOTAL CA: CPT | Performed by: SURGERY

## 2021-11-09 PROCEDURE — 94660 CPAP INITIATION&MGMT: CPT

## 2021-11-09 PROCEDURE — 83735 ASSAY OF MAGNESIUM: CPT | Performed by: NURSE PRACTITIONER

## 2021-11-09 PROCEDURE — 95819 EEG AWAKE AND ASLEEP: CPT | Performed by: PSYCHIATRY & NEUROLOGY

## 2021-11-09 RX ORDER — GABAPENTIN 300 MG/1
300 CAPSULE ORAL 3 TIMES DAILY
Status: DISCONTINUED | OUTPATIENT
Start: 2021-11-09 | End: 2021-11-12 | Stop reason: HOSPADM

## 2021-11-09 RX ORDER — PANTOPRAZOLE SODIUM 40 MG/1
40 TABLET, DELAYED RELEASE ORAL 2 TIMES DAILY
Status: DISCONTINUED | OUTPATIENT
Start: 2021-11-09 | End: 2021-11-12 | Stop reason: HOSPADM

## 2021-11-09 RX ORDER — ALPRAZOLAM 0.25 MG/1
0.25 TABLET ORAL 3 TIMES DAILY PRN
Status: DISCONTINUED | OUTPATIENT
Start: 2021-11-09 | End: 2021-11-12 | Stop reason: HOSPADM

## 2021-11-09 RX ORDER — ACETAMINOPHEN 325 MG/1
650 TABLET ORAL EVERY 6 HOURS PRN
Status: DISCONTINUED | OUTPATIENT
Start: 2021-11-09 | End: 2021-11-12 | Stop reason: HOSPADM

## 2021-11-09 RX ADMIN — ENOXAPARIN SODIUM 40 MG: 40 INJECTION SUBCUTANEOUS at 09:56

## 2021-11-09 RX ADMIN — GABAPENTIN 300 MG: 300 CAPSULE ORAL at 16:14

## 2021-11-09 RX ADMIN — LORAZEPAM 0.5 MG: 2 INJECTION INTRAMUSCULAR; INTRAVENOUS at 01:48

## 2021-11-09 RX ADMIN — SODIUM CHLORIDE 100 ML/HR: 0.9 INJECTION, SOLUTION INTRAVENOUS at 13:45

## 2021-11-09 RX ADMIN — ONDANSETRON 4 MG: 2 INJECTION INTRAMUSCULAR; INTRAVENOUS at 20:20

## 2021-11-09 RX ADMIN — ALPRAZOLAM 0.25 MG: 0.25 TABLET ORAL at 20:20

## 2021-11-09 RX ADMIN — GABAPENTIN 300 MG: 300 CAPSULE ORAL at 20:20

## 2021-11-09 RX ADMIN — PANTOPRAZOLE SODIUM 40 MG: 40 TABLET, DELAYED RELEASE ORAL at 16:14

## 2021-11-09 RX ADMIN — SODIUM CHLORIDE 100 ML/HR: 0.9 INJECTION, SOLUTION INTRAVENOUS at 23:18

## 2021-11-09 RX ADMIN — KETOROLAC TROMETHAMINE 15 MG: 30 INJECTION, SOLUTION INTRAMUSCULAR at 20:20

## 2021-11-09 RX ADMIN — KETOROLAC TROMETHAMINE 15 MG: 30 INJECTION, SOLUTION INTRAMUSCULAR at 09:56

## 2021-11-10 ENCOUNTER — APPOINTMENT (INPATIENT)
Dept: CT IMAGING | Facility: HOSPITAL | Age: 41
DRG: 988 | End: 2021-11-10
Payer: COMMERCIAL

## 2021-11-10 LAB
ANION GAP SERPL CALCULATED.3IONS-SCNC: 6 MMOL/L (ref 4–13)
AORTIC ROOT: 3.5 CM
APICAL FOUR CHAMBER EJECTION FRACTION: 59 %
BASOPHILS # BLD AUTO: 0.03 THOUSANDS/ΜL (ref 0–0.1)
BASOPHILS NFR BLD AUTO: 0 % (ref 0–1)
BUN SERPL-MCNC: 8 MG/DL (ref 5–25)
CALCIUM SERPL-MCNC: 7.9 MG/DL (ref 8.3–10.1)
CHLORIDE SERPL-SCNC: 108 MMOL/L (ref 100–108)
CO2 SERPL-SCNC: 27 MMOL/L (ref 21–32)
CREAT SERPL-MCNC: 0.93 MG/DL (ref 0.6–1.3)
E WAVE DECELERATION TIME: 182 MS
EOSINOPHIL # BLD AUTO: 0.06 THOUSAND/ΜL (ref 0–0.61)
EOSINOPHIL NFR BLD AUTO: 1 % (ref 0–6)
ERYTHROCYTE [DISTWIDTH] IN BLOOD BY AUTOMATED COUNT: 13 % (ref 11.6–15.1)
FRACTIONAL SHORTENING: 37 % (ref 28–44)
GFR SERPL CREATININE-BSD FRML MDRD: 102 ML/MIN/1.73SQ M
GLUCOSE SERPL-MCNC: 87 MG/DL (ref 65–140)
HCT VFR BLD AUTO: 28.2 % (ref 36.5–49.3)
HGB BLD-MCNC: 9.2 G/DL (ref 12–17)
IMM GRANULOCYTES # BLD AUTO: 0.03 THOUSAND/UL (ref 0–0.2)
IMM GRANULOCYTES NFR BLD AUTO: 0 % (ref 0–2)
INTERVENTRICULAR SEPTUM IN DIASTOLE (PARASTERNAL SHORT AXIS VIEW): 1.1 CM
LAAS-AP4: 18.9 CM2
LEFT INTERNAL DIMENSION IN SYSTOLE: 2.9 CM (ref 2.1–4)
LEFT VENTRICULAR INTERNAL DIMENSION IN DIASTOLE: 4.6 CM (ref 23.31–34.76)
LEFT VENTRICULAR POSTERIOR WALL IN END DIASTOLE: 1.1 CM
LEFT VENTRICULAR STROKE VOLUME: 66 ML
LYMPHOCYTES # BLD AUTO: 1.77 THOUSANDS/ΜL (ref 0.6–4.47)
LYMPHOCYTES NFR BLD AUTO: 25 % (ref 14–44)
MAGNESIUM SERPL-MCNC: 2.1 MG/DL (ref 1.6–2.6)
MCH RBC QN AUTO: 28.6 PG (ref 26.8–34.3)
MCHC RBC AUTO-ENTMCNC: 32.6 G/DL (ref 31.4–37.4)
MCV RBC AUTO: 88 FL (ref 82–98)
MONOCYTES # BLD AUTO: 0.55 THOUSAND/ΜL (ref 0.17–1.22)
MONOCYTES NFR BLD AUTO: 8 % (ref 4–12)
MV E'TISSUE VEL-LAT: 16 CM/S
MV E'TISSUE VEL-SEP: 9 CM/S
MV PEAK A VEL: 0.57 M/S
MV PEAK E VEL: 88 CM/S
MV STENOSIS PRESSURE HALF TIME: 0 MS
NEUTROPHILS # BLD AUTO: 4.69 THOUSANDS/ΜL (ref 1.85–7.62)
NEUTS SEG NFR BLD AUTO: 66 % (ref 43–75)
NRBC BLD AUTO-RTO: 0 /100 WBCS
PLATELET # BLD AUTO: 282 THOUSANDS/UL (ref 149–390)
PMV BLD AUTO: 10.2 FL (ref 8.9–12.7)
POTASSIUM SERPL-SCNC: 3.6 MMOL/L (ref 3.5–5.3)
RBC # BLD AUTO: 3.22 MILLION/UL (ref 3.88–5.62)
RIGHT ATRIAL 2D VOLUME: 26 ML
RIGHT ATRIUM AREA SYSTOLE A4C: 11.5 CM2
RIGHT VENTRICLE ID DIMENSION: 3.1 CM
SL CV LV EF: 65
SL CV PED ECHO LEFT VENTRICLE DIASTOLIC VOLUME (MOD BIPLANE) 2D: 99 ML
SL CV PED ECHO LEFT VENTRICLE SYSTOLIC VOLUME (MOD BIPLANE) 2D: 33 ML
SODIUM SERPL-SCNC: 141 MMOL/L (ref 136–145)
TRICUSPID ANNULAR PLANE SYSTOLIC EXCURSION: 1.9 CM
TRICUSPID VALVE S': 55 CM/S
WBC # BLD AUTO: 7.13 THOUSAND/UL (ref 4.31–10.16)
Z-SCORE OF LEFT VENTRICULAR DIMENSION IN END SYSTOLE: -18.23

## 2021-11-10 PROCEDURE — 80048 BASIC METABOLIC PNL TOTAL CA: CPT | Performed by: STUDENT IN AN ORGANIZED HEALTH CARE EDUCATION/TRAINING PROGRAM

## 2021-11-10 PROCEDURE — 93306 TTE W/DOPPLER COMPLETE: CPT | Performed by: INTERNAL MEDICINE

## 2021-11-10 PROCEDURE — 99232 SBSQ HOSP IP/OBS MODERATE 35: CPT | Performed by: STUDENT IN AN ORGANIZED HEALTH CARE EDUCATION/TRAINING PROGRAM

## 2021-11-10 PROCEDURE — 85025 COMPLETE CBC W/AUTO DIFF WBC: CPT | Performed by: STUDENT IN AN ORGANIZED HEALTH CARE EDUCATION/TRAINING PROGRAM

## 2021-11-10 PROCEDURE — 99232 SBSQ HOSP IP/OBS MODERATE 35: CPT | Performed by: INTERNAL MEDICINE

## 2021-11-10 PROCEDURE — G1004 CDSM NDSC: HCPCS

## 2021-11-10 PROCEDURE — 71275 CT ANGIOGRAPHY CHEST: CPT

## 2021-11-10 PROCEDURE — 83735 ASSAY OF MAGNESIUM: CPT | Performed by: STUDENT IN AN ORGANIZED HEALTH CARE EDUCATION/TRAINING PROGRAM

## 2021-11-10 PROCEDURE — G0426 INPT/ED TELECONSULT50: HCPCS | Performed by: PSYCHIATRY & NEUROLOGY

## 2021-11-10 RX ADMIN — GABAPENTIN 300 MG: 300 CAPSULE ORAL at 16:51

## 2021-11-10 RX ADMIN — SODIUM CHLORIDE 100 ML/HR: 0.9 INJECTION, SOLUTION INTRAVENOUS at 09:16

## 2021-11-10 RX ADMIN — GABAPENTIN 300 MG: 300 CAPSULE ORAL at 20:36

## 2021-11-10 RX ADMIN — PANTOPRAZOLE SODIUM 40 MG: 40 TABLET, DELAYED RELEASE ORAL at 20:36

## 2021-11-10 RX ADMIN — GABAPENTIN 300 MG: 300 CAPSULE ORAL at 09:16

## 2021-11-10 RX ADMIN — VILAZODONE HYDROCHLORIDE 30 MG: 20 TABLET ORAL at 05:30

## 2021-11-10 RX ADMIN — KETOROLAC TROMETHAMINE 15 MG: 30 INJECTION, SOLUTION INTRAMUSCULAR at 09:39

## 2021-11-10 RX ADMIN — KETOROLAC TROMETHAMINE 15 MG: 30 INJECTION, SOLUTION INTRAMUSCULAR at 20:37

## 2021-11-10 RX ADMIN — ENOXAPARIN SODIUM 40 MG: 40 INJECTION SUBCUTANEOUS at 09:16

## 2021-11-10 RX ADMIN — ALPRAZOLAM 0.25 MG: 0.25 TABLET ORAL at 20:36

## 2021-11-10 RX ADMIN — PANTOPRAZOLE SODIUM 40 MG: 40 TABLET, DELAYED RELEASE ORAL at 09:16

## 2021-11-10 RX ADMIN — IOHEXOL 100 ML: 350 INJECTION, SOLUTION INTRAVENOUS at 10:22

## 2021-11-10 RX ADMIN — ACETAMINOPHEN 650 MG: 325 TABLET ORAL at 18:35

## 2021-11-10 RX ADMIN — KETOROLAC TROMETHAMINE 15 MG: 30 INJECTION, SOLUTION INTRAMUSCULAR at 02:15

## 2021-11-10 RX ADMIN — ONDANSETRON 4 MG: 2 INJECTION INTRAMUSCULAR; INTRAVENOUS at 09:39

## 2021-11-11 LAB
ANION GAP SERPL CALCULATED.3IONS-SCNC: 6 MMOL/L (ref 4–13)
BASOPHILS # BLD AUTO: 0.03 THOUSANDS/ΜL (ref 0–0.1)
BASOPHILS NFR BLD AUTO: 1 % (ref 0–1)
BUN SERPL-MCNC: 8 MG/DL (ref 5–25)
CALCIUM SERPL-MCNC: 8.2 MG/DL (ref 8.3–10.1)
CHLORIDE SERPL-SCNC: 108 MMOL/L (ref 100–108)
CO2 SERPL-SCNC: 28 MMOL/L (ref 21–32)
CREAT SERPL-MCNC: 0.85 MG/DL (ref 0.6–1.3)
EOSINOPHIL # BLD AUTO: 0.11 THOUSAND/ΜL (ref 0–0.61)
EOSINOPHIL NFR BLD AUTO: 2 % (ref 0–6)
ERYTHROCYTE [DISTWIDTH] IN BLOOD BY AUTOMATED COUNT: 12.8 % (ref 11.6–15.1)
GFR SERPL CREATININE-BSD FRML MDRD: 109 ML/MIN/1.73SQ M
GLUCOSE SERPL-MCNC: 99 MG/DL (ref 65–140)
HCT VFR BLD AUTO: 28.5 % (ref 36.5–49.3)
HGB BLD-MCNC: 9.3 G/DL (ref 12–17)
IMM GRANULOCYTES # BLD AUTO: 0.05 THOUSAND/UL (ref 0–0.2)
IMM GRANULOCYTES NFR BLD AUTO: 1 % (ref 0–2)
LYMPHOCYTES # BLD AUTO: 1.28 THOUSANDS/ΜL (ref 0.6–4.47)
LYMPHOCYTES NFR BLD AUTO: 20 % (ref 14–44)
MCH RBC QN AUTO: 28.5 PG (ref 26.8–34.3)
MCHC RBC AUTO-ENTMCNC: 32.6 G/DL (ref 31.4–37.4)
MCV RBC AUTO: 87 FL (ref 82–98)
MONOCYTES # BLD AUTO: 0.47 THOUSAND/ΜL (ref 0.17–1.22)
MONOCYTES NFR BLD AUTO: 7 % (ref 4–12)
NEUTROPHILS # BLD AUTO: 4.45 THOUSANDS/ΜL (ref 1.85–7.62)
NEUTS SEG NFR BLD AUTO: 69 % (ref 43–75)
NRBC BLD AUTO-RTO: 0 /100 WBCS
PLATELET # BLD AUTO: 282 THOUSANDS/UL (ref 149–390)
PMV BLD AUTO: 10 FL (ref 8.9–12.7)
POTASSIUM SERPL-SCNC: 3.4 MMOL/L (ref 3.5–5.3)
RBC # BLD AUTO: 3.26 MILLION/UL (ref 3.88–5.62)
SODIUM SERPL-SCNC: 142 MMOL/L (ref 136–145)
WBC # BLD AUTO: 6.39 THOUSAND/UL (ref 4.31–10.16)

## 2021-11-11 PROCEDURE — 80048 BASIC METABOLIC PNL TOTAL CA: CPT | Performed by: PHYSICIAN ASSISTANT

## 2021-11-11 PROCEDURE — 99232 SBSQ HOSP IP/OBS MODERATE 35: CPT | Performed by: NURSE PRACTITIONER

## 2021-11-11 PROCEDURE — 85025 COMPLETE CBC W/AUTO DIFF WBC: CPT | Performed by: PHYSICIAN ASSISTANT

## 2021-11-11 RX ORDER — BISACODYL 10 MG
10 SUPPOSITORY, RECTAL RECTAL ONCE
Status: COMPLETED | OUTPATIENT
Start: 2021-11-11 | End: 2021-11-11

## 2021-11-11 RX ADMIN — PANTOPRAZOLE SODIUM 40 MG: 40 TABLET, DELAYED RELEASE ORAL at 20:00

## 2021-11-11 RX ADMIN — ACETAMINOPHEN 650 MG: 325 TABLET ORAL at 17:38

## 2021-11-11 RX ADMIN — BISACODYL 10 MG: 10 SUPPOSITORY RECTAL at 11:18

## 2021-11-11 RX ADMIN — GABAPENTIN 300 MG: 300 CAPSULE ORAL at 20:00

## 2021-11-11 RX ADMIN — ALPRAZOLAM 0.25 MG: 0.25 TABLET ORAL at 11:15

## 2021-11-11 RX ADMIN — VILAZODONE HYDROCHLORIDE 30 MG: 20 TABLET ORAL at 11:06

## 2021-11-11 RX ADMIN — ENOXAPARIN SODIUM 40 MG: 40 INJECTION SUBCUTANEOUS at 09:36

## 2021-11-11 RX ADMIN — SODIUM CHLORIDE 100 ML/HR: 0.9 INJECTION, SOLUTION INTRAVENOUS at 20:04

## 2021-11-11 RX ADMIN — GABAPENTIN 300 MG: 300 CAPSULE ORAL at 09:36

## 2021-11-11 RX ADMIN — GABAPENTIN 300 MG: 300 CAPSULE ORAL at 15:26

## 2021-11-11 RX ADMIN — ALPRAZOLAM 0.25 MG: 0.25 TABLET ORAL at 23:50

## 2021-11-11 RX ADMIN — PANTOPRAZOLE SODIUM 40 MG: 40 TABLET, DELAYED RELEASE ORAL at 09:36

## 2021-11-12 VITALS
SYSTOLIC BLOOD PRESSURE: 102 MMHG | DIASTOLIC BLOOD PRESSURE: 60 MMHG | BODY MASS INDEX: 41.99 KG/M2 | OXYGEN SATURATION: 95 % | WEIGHT: 310 LBS | HEART RATE: 74 BPM | RESPIRATION RATE: 18 BRPM | TEMPERATURE: 97.9 F | HEIGHT: 72 IN

## 2021-11-12 PROBLEM — R55 SYNCOPE: Status: RESOLVED | Noted: 2021-11-08 | Resolved: 2021-11-12

## 2021-11-12 PROBLEM — K43.6 INCARCERATED VENTRAL HERNIA: Status: RESOLVED | Noted: 2020-02-09 | Resolved: 2021-11-12

## 2021-11-12 RX ADMIN — ENOXAPARIN SODIUM 40 MG: 40 INJECTION SUBCUTANEOUS at 08:43

## 2021-11-12 RX ADMIN — ACETAMINOPHEN 650 MG: 325 TABLET ORAL at 03:23

## 2021-11-12 RX ADMIN — GABAPENTIN 300 MG: 300 CAPSULE ORAL at 08:43

## 2021-11-12 RX ADMIN — SODIUM CHLORIDE 100 ML/HR: 0.9 INJECTION, SOLUTION INTRAVENOUS at 08:34

## 2021-11-12 RX ADMIN — VILAZODONE HYDROCHLORIDE 30 MG: 20 TABLET ORAL at 08:43

## 2021-11-12 RX ADMIN — PANTOPRAZOLE SODIUM 40 MG: 40 TABLET, DELAYED RELEASE ORAL at 08:43

## 2021-11-15 ENCOUNTER — TELEPHONE (OUTPATIENT)
Dept: CARDIOLOGY CLINIC | Facility: CLINIC | Age: 41
End: 2021-11-15

## 2021-11-16 ENCOUNTER — TELEPHONE (OUTPATIENT)
Dept: CARDIOLOGY CLINIC | Facility: CLINIC | Age: 41
End: 2021-11-16

## 2021-11-16 DIAGNOSIS — R55 SYNCOPE, UNSPECIFIED SYNCOPE TYPE: Primary | ICD-10-CM

## 2021-11-18 ENCOUNTER — CLINICAL SUPPORT (OUTPATIENT)
Dept: NUTRITION | Facility: CLINIC | Age: 41
End: 2021-11-18
Payer: COMMERCIAL

## 2021-11-18 VITALS — WEIGHT: 277 LBS | BODY MASS INDEX: 37.57 KG/M2

## 2021-11-18 DIAGNOSIS — K90.0 CELIAC DISEASE: ICD-10-CM

## 2021-11-18 DIAGNOSIS — E66.9 OBESITY (BMI 35.0-39.9 WITHOUT COMORBIDITY): ICD-10-CM

## 2021-11-18 PROCEDURE — 97802 MEDICAL NUTRITION INDIV IN: CPT | Performed by: DIETITIAN, REGISTERED

## 2021-11-24 ENCOUNTER — TELEPHONE (OUTPATIENT)
Dept: CARDIOLOGY CLINIC | Facility: CLINIC | Age: 41
End: 2021-11-24

## 2021-12-01 ENCOUNTER — TELEPHONE (OUTPATIENT)
Dept: OTHER | Facility: OTHER | Age: 41
End: 2021-12-01

## 2021-12-10 PROBLEM — R00.1 BRADYCARDIA: Status: ACTIVE | Noted: 2021-12-10

## 2021-12-13 ENCOUNTER — OFFICE VISIT (OUTPATIENT)
Dept: CARDIOLOGY CLINIC | Facility: CLINIC | Age: 41
End: 2021-12-13
Payer: COMMERCIAL

## 2021-12-13 VITALS
OXYGEN SATURATION: 98 % | DIASTOLIC BLOOD PRESSURE: 82 MMHG | SYSTOLIC BLOOD PRESSURE: 122 MMHG | BODY MASS INDEX: 36.16 KG/M2 | WEIGHT: 267 LBS | HEIGHT: 72 IN | HEART RATE: 47 BPM

## 2021-12-13 DIAGNOSIS — R00.1 BRADYCARDIA: ICD-10-CM

## 2021-12-13 DIAGNOSIS — R55 SYNCOPE, UNSPECIFIED SYNCOPE TYPE: Primary | ICD-10-CM

## 2021-12-13 DIAGNOSIS — G47.33 OSA (OBSTRUCTIVE SLEEP APNEA): ICD-10-CM

## 2021-12-13 PROCEDURE — 99213 OFFICE O/P EST LOW 20 MIN: CPT | Performed by: NURSE PRACTITIONER

## 2021-12-13 RX ORDER — BUPROPION HYDROCHLORIDE 300 MG/1
300 TABLET ORAL DAILY
COMMUNITY
End: 2021-12-19 | Stop reason: ALTCHOICE

## 2021-12-13 RX ORDER — OXICONAZOLE NITRATE 10 MG/G
CREAM TOPICAL
COMMUNITY

## 2021-12-13 RX ORDER — ESCITALOPRAM OXALATE 20 MG/1
20 TABLET ORAL DAILY
COMMUNITY
End: 2021-12-19 | Stop reason: ALTCHOICE

## 2021-12-13 RX ORDER — MULTIVITAMIN
TABLET ORAL DAILY
COMMUNITY

## 2021-12-13 RX ORDER — SODIUM PICOSULFATE, MAGNESIUM OXIDE, AND ANHYDROUS CITRIC ACID 10; 3.5; 12 MG/160ML; G/160ML; G/160ML
LIQUID ORAL
COMMUNITY
Start: 2021-09-30 | End: 2021-12-19 | Stop reason: ALTCHOICE

## 2021-12-13 RX ORDER — KETOCONAZOLE 20 MG/ML
SHAMPOO TOPICAL
COMMUNITY

## 2021-12-13 RX ORDER — PREDNISONE 20 MG/1
TABLET ORAL
COMMUNITY
Start: 2021-10-11 | End: 2021-12-19 | Stop reason: ALTCHOICE

## 2021-12-16 ENCOUNTER — TELEPHONE (OUTPATIENT)
Dept: CARDIOLOGY CLINIC | Facility: CLINIC | Age: 41
End: 2021-12-16

## 2021-12-21 ENCOUNTER — CLINICAL SUPPORT (OUTPATIENT)
Dept: CARDIOLOGY CLINIC | Facility: CLINIC | Age: 41
End: 2021-12-21
Payer: COMMERCIAL

## 2021-12-21 DIAGNOSIS — R55 SYNCOPE, UNSPECIFIED SYNCOPE TYPE: ICD-10-CM

## 2021-12-21 PROCEDURE — 93228 REMOTE 30 DAY ECG REV/REPORT: CPT | Performed by: INTERNAL MEDICINE

## 2021-12-28 ENCOUNTER — CLINICAL SUPPORT (OUTPATIENT)
Dept: NUTRITION | Facility: CLINIC | Age: 41
End: 2021-12-28
Payer: COMMERCIAL

## 2021-12-28 VITALS — BODY MASS INDEX: 35.13 KG/M2 | WEIGHT: 259 LBS

## 2021-12-28 DIAGNOSIS — K90.0 CELIAC DISEASE: ICD-10-CM

## 2021-12-28 DIAGNOSIS — E66.9 CLASS 2 OBESITY WITHOUT SERIOUS COMORBIDITY WITH BODY MASS INDEX (BMI) OF 35.0 TO 35.9 IN ADULT, UNSPECIFIED OBESITY TYPE: ICD-10-CM

## 2021-12-28 PROCEDURE — 97803 MED NUTRITION INDIV SUBSEQ: CPT | Performed by: DIETITIAN, REGISTERED

## 2022-02-15 ENCOUNTER — OFFICE VISIT (OUTPATIENT)
Dept: CARDIOLOGY CLINIC | Facility: CLINIC | Age: 42
End: 2022-02-15
Payer: COMMERCIAL

## 2022-02-15 VITALS
HEART RATE: 75 BPM | DIASTOLIC BLOOD PRESSURE: 86 MMHG | WEIGHT: 233.2 LBS | BODY MASS INDEX: 31.59 KG/M2 | TEMPERATURE: 97.8 F | HEIGHT: 72 IN | OXYGEN SATURATION: 98 % | SYSTOLIC BLOOD PRESSURE: 122 MMHG

## 2022-02-15 DIAGNOSIS — G47.33 OSA (OBSTRUCTIVE SLEEP APNEA): ICD-10-CM

## 2022-02-15 DIAGNOSIS — R00.1 BRADYCARDIA: ICD-10-CM

## 2022-02-15 DIAGNOSIS — R55 SYNCOPE, UNSPECIFIED SYNCOPE TYPE: Primary | ICD-10-CM

## 2022-02-15 PROCEDURE — 99214 OFFICE O/P EST MOD 30 MIN: CPT | Performed by: NURSE PRACTITIONER

## 2022-02-15 RX ORDER — METHYLPHENIDATE HYDROCHLORIDE 36 MG/1
54 TABLET, EXTENDED RELEASE ORAL DAILY
COMMUNITY
Start: 2022-02-04 | End: 2022-06-30

## 2022-02-15 NOTE — PATIENT INSTRUCTIONS
I am placing a referral to our electrophysiologist for further evaluation of your heart rates  I do think you have some orthostatic blood pressures  Please continue with increased salt intake with your high water intake  Trial compression socks with at least 15-22mm of compression  Congratulations on your weight loss, keep up the efforts  Continue with trying for increased exercise as tolerated

## 2022-02-15 NOTE — PROGRESS NOTES
Cardiology Follow Up    Fiona Gerardo  1980  [id number]  [facility] [place] [id number] [facility] [address]  [address] FLO[address]  [facility] [id number] [phone]    Roberta Marcano presents for follow up of syncope with bradycardia  1  Syncope, unspecified syncope type  Assessment & Plan:  Pt has a history of syncope in his teenage years along with a history of bradycardia/junctional rhythm  He was evaluated by cardiology in [place] at that time and diagnosed with vasovagal syncope  He was prescribed midodrine at that time  He does report episodes of near-syncope in the recent past, evaluated by his PCP with ECG in office showing bradycardia in 40's  Now with recurrent witnessed syncopal events - at home after surgery and then 2 witnessed events in the ER  Telemetry review indicates heart rate in the 40's the evening of hospital admission  Strips reviewed with EP, who felt this was consistent with vasovagal bradycardia  Echo 11/9/2021 with EF 65%  14 day AT O 12/1-12/15/21 reveals HR  with avg 62 bpm  Heart rate does go down into the 30's while awake  No high grade block or pauses identified  Will refer to EP for further evaluation and treatment  Orders:  -     Ambulatory referral to Cardiac Electrophysiology; Future    2  Bradycardia  Assessment & Plan:  See discussion under syncope  Orders:  -     Ambulatory referral to Cardiac Electrophysiology; Future    3  INDRA (obstructive sleep apnea)  Assessment & Plan:  History of INDRA as well as episodes of central sleep apnea noted  He uses CPAP faithfully and follows with sleep medicine  4  BMI 31 0-31 9,adult  Assessment & Plan:  BMI 31 63kg/m2  Patient has lost 30 pounds since December 2021 with dietary changes  I applauded his efforts  HPI  Roberta Marcano has a past medical history of syncope, bradycardia/junctional rhythm, INDRA, obesity, gastritis, pancreatitis, questionable celiac disease with anemia  Previously seen by cardiology in [facility] in teen years for syncope and [facility] in 2019 for episode of chest pain        He underwent a work up by cardiology in [facility] in his teen years for recurrent syncopal events  He reports undergoing stress tests, Tilt table tests  He was diagnosed with vasovagal syncope and started on Midrodrine  He was instructed to increase water and salt intake  He was eventually lost to follow up  In 2019 he was seen at [facility] for chest and abdominal pain at which time a cardiac work up was unrevealing other than sinus bradycardia in the 50's noted on ECG  Stress echo was negative for ischemia changes at that time      He reports that his primary care provider has performed repeated ECG's at office visits due to complaints of near-syncope revealing HR in the 40's and junctional rhythm      He presented to [facility] ER on 11/8/2021 following an episode of syncope at home  He underwent ventral hernia repair on 11/7 morning  His wife does assist with providing the history  She states that he was sleeping most of the day and around 5pm she encouraged him to get up, eat, and go to the bathroom  While walking back from the bathroom he leaned against the recliner and stated he did not feel well  His wife supported him as he slumped over, she laid him on the floor  She states he was unresponsive for several minutes  His BP was 70/30 when she checked it  She did not check his HR  She gave him some icing on his lip and he did arouse  Upon EMS arrival BP was 100/60 after wife had him in trendelenburg position and HR was in the 40's  Blood sugar was 151 upon EMS arrival   He did have another syncopal episode after arriving to the ER, wife believes around 7pm, and then a near-syncopal episode around 10pm   Telemetry during admission did reveal sinus bradycardia during sleep with no recurrent syncopal events   His telemetry strips were reviewed with our EP, who confirmed consistent with vasovagal bradycardia  He did have an episode of central sleep apnea that occurred during his admission where he was alerted by his CPAP company  He is in communication with his sleep medicine provider about this  Echo 11/9 with EF 65% and no significant abnormalities  He was discharged with plan for 14 day live ZIO      12/13/2021: Grace Michaels presents today for hospital follow up  3 episodes since wearing live zio where he felt fatigued, lethargic, palpitations  No syncope or near syncope  He is working with his psychiatrist who recommends treatment of ADHD and requesting permission to use Adderall or Concerta  2/15/2022: Grace Michaels presents today to review results of his ZIO monitor  This revealed avg HR 62 bpm  His slowest HR was 34bpm, which was during sleeping hours, however, his HR was noted to be in the 40-50's on triggered events  He is able to get his heart rate into the 170's with exercise  No high grade blocks or pauses were evident  No syncopal events occurred during the recording period  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 and is worried if he were to pass out  He is actively working toward weight loss  He has modified his eating and lost 30 pounds since December  He also started Concerta through his psychiatrist and states his ADD symptoms are much improved  Medical Problems             Problem List     Syncope    Bradycardia    INDRA (obstructive sleep apnea)    Postoperative ileus (HCC)              Past Medical History:   Diagnosis Date    Anemia     Anxiety     BMI 37 0-37 9, adult     Bradycardia     CPAP (continuous positive airway pressure) dependence     Pt states he is compliant with use   Depression     Gastritis     GERD (gastroesophageal reflux disease)     History of ileus     Pt states it was caused by antibiotic use      Idiopathic pancreatitis     Irritable bowel syndrome     Sleep apnea     Syncope     in teen years, saw cardiology in [place], given Midrodrine  Diagnosed vasovagal at the time   Thrombosed hemorrhoids     Ventral hernia     Vitamin D deficiency      Social History     Socioeconomic History    Marital status: /Civil Union     Spouse name: Not on file    Number of children: Not on file    Years of education: Not on file    Highest education level: Not on file   Occupational History    Not on file   Tobacco Use    Smoking status: Never Smoker    Smokeless tobacco: Never Used   Vaping Use    Vaping Use: Never used   Substance and Sexual Activity    Alcohol use: Yes     Comment: rarely    Drug use: Not Currently    Sexual activity: Not on file     Comment: defer   Other Topics Concern    Not on file   Social History Narrative    Not on file     Social Determinants of Health     Financial Resource Strain: Not on file   Food Insecurity: Not on file   Transportation Needs: Not on file   Physical Activity: Not on file   Stress: Not on file   Social Connections: Not on file   Intimate Partner Violence: Not on file   Housing Stability: Not on file      History reviewed  No pertinent family history  Past Surgical History:   Procedure Laterality Date    ANKLE FRACTURE SURGERY Left     CHOLECYSTECTOMY      HERNIA REPAIR      INGUINAL HERNIA REPAIR Bilateral     Pt reports having as a child      UT REPAIR RECURR INCIS HERNIA,REDUC N/A 11/8/2021    Procedure: VENTRAL HERNIA REPAIR;  Surgeon: Wood Jorgensen DO;  Location: [facility];  Service: General    VENTRAL HERNIA REPAIR N/A 2/10/2020    Procedure: REPAIR HERNIA VENTRAL;  Surgeon: Wood Jorgensen DO;  Location:  MAIN OR;  Service: General       Current Outpatient Medications:     acetaminophen (TYLENOL) 500 mg tablet, Take 1,000 mg by mouth every 6 (six) hours as needed for mild pain, Disp: , Rfl:     cholecalciferol (VITAMIN D3) 1,000 units tablet, Take 2,000 Units by mouth daily  , Disp: , Rfl:     gabapentin (NEURONTIN) 300 mg capsule, Take 300 mg by mouth 3 (three) times a day, Disp: , Rfl:     Hyoscyamine Sulfate SL 0 125 MG SUBL, Place under the tongue as needed, Disp: , Rfl:     ibuprofen (MOTRIN) 400 mg tablet, Take by mouth every 6 (six) hours as needed for mild pain, Disp: , Rfl:     ketoconazole (NIZORAL) 2 % shampoo, , Disp: , Rfl:     LORazepam (ATIVAN) 0 5 mg tablet, Take by mouth every 6 (six) hours as needed for anxiety, Disp: , Rfl:     Methylphenidate HCl ER 36 MG TB24, , Disp: , Rfl:     Multiple Vitamin (Multi Vitamin Daily) TABS, Take by mouth daily, Disp: , Rfl:     ondansetron (ZOFRAN) 4 mg tablet, Take 4 mg by mouth every 8 (eight) hours as needed for nausea or vomiting, Disp: , Rfl:     oxiconazole (Oxistat) 1 % CREA, , Disp: , Rfl:     pantoprazole (PROTONIX) 40 mg tablet, Take 20 mg by mouth 2 (two) times a day  , Disp: , Rfl:     traMADol (ULTRAM) 50 mg tablet, Take 50 mg by mouth every 6 (six) hours as needed for moderate pain (Pt takes for severe pain ), Disp: , Rfl:     vilazodone (VIIBRYD) 20 mg tablet, Take 30 mg by mouth daily , Disp: , Rfl:   Allergies   Allergen Reactions    Gluten Meal - Food Allergy GI Intolerance     Reported Celiac's disease       Labs:     Chemistry        Component Value Date/Time    K 3 4 (L) 11/11/2021 0514     11/11/2021 0514    CO2 28 11/11/2021 0514    BUN 8 11/11/2021 0514    CREATININE 0 85 11/11/2021 0514        Component Value Date/Time    CALCIUM 8 2 (L) 11/11/2021 0514    ALKPHOS 109 11/08/2021 1858    AST 14 11/08/2021 1858    ALT 31 11/08/2021 1858        Cardiac imaging:  ZIO 12/1/-12/15/2021:  Min HR of 34 bpm, max HR of 178 bpm, and avg HR of 62bpm    Predominant underlying rhythm was Sinus Rhythm  Rare PAC's and rare PVC's  Echocardiogram 11/9/2021:  EF 65%  Left atrium mildly dilated  Trace MR  Trace TR      ECG 11/8/2021: Sinus bradycardia  Poor anterior R wave progression is noted  Review of Systems   Constitutional: Positive for malaise/fatigue  HENT: Negative  Cardiovascular: Positive for near-syncope and syncope  Negative for chest pain, dyspnea on exertion, irregular heartbeat, leg swelling, orthopnea and palpitations  Respiratory: Negative for cough and snoring  Endocrine: Negative  Skin: Negative  Musculoskeletal: Negative  Gastrointestinal: Negative  Genitourinary: Negative  Psychiatric/Behavioral: Negative  Vitals:    02/15/22 1439   BP: 122/86   Pulse: 75   Temp: 97 8 °F (36 6 °C)   SpO2: 98%     Vitals:    02/15/22 1439   Weight: 106 kg (233 lb 3 2 oz)     Height: 6' (182 9 cm)   Body mass index is 31 63 kg/m²  Physical Exam  Vitals and nursing note reviewed  Constitutional:       General: He is not in acute distress  Appearance: He is well-developed  He is obese  He is not diaphoretic  HENT:      Head: Normocephalic and atraumatic  Neck:      Vascular: No carotid bruit or JVD  Cardiovascular:      Rate and Rhythm: Normal rate and regular rhythm  No extrasystoles are present  Pulses: Intact distal pulses  Heart sounds: Normal heart sounds, S1 normal and S2 normal  No murmur heard  No friction rub  No gallop  Comments: No lower leg edema  Pulmonary:      Effort: Pulmonary effort is normal  No respiratory distress  Breath sounds: Normal breath sounds  Abdominal:      General: There is no distension  Palpations: Abdomen is soft  Tenderness: There is no abdominal tenderness  Skin:     General: Skin is warm and dry  Findings: No rash  Neurological:      Mental Status: He is alert and oriented to person, place, and time  Psychiatric:         Mood and Affect: Mood and affect normal          Speech: Speech normal          Behavior: Behavior normal  Behavior is cooperative           Cognition and Memory: Cognition and memory normal

## 2022-02-19 NOTE — ASSESSMENT & PLAN NOTE
BMI 31 63kg/m2  Patient has lost 30 pounds since December 2021 with dietary changes  I applauded his efforts

## 2022-02-19 NOTE — ASSESSMENT & PLAN NOTE
Pt has a history of syncope in his teenage years along with a history of bradycardia/junctional rhythm  He was evaluated by cardiology in [place] at that time and diagnosed with vasovagal syncope  He was prescribed midodrine at that time  He does report episodes of near-syncope in the recent past, evaluated by his PCP with ECG in office showing bradycardia in 40's  Now with recurrent witnessed syncopal events - at home after surgery and then 2 witnessed events in the ER  Telemetry review indicates heart rate in the 40's the evening of hospital admission  Strips reviewed with EP, who felt this was consistent with vasovagal bradycardia  Echo 11/9/2021 with EF 65%  14 day AT O 12/1-12/15/21 reveals HR  with avg 62 bpm  Heart rate does go down into the 30's while awake  No high grade block or pauses identified  Will refer to EP for further evaluation and treatment

## 2022-03-01 ENCOUNTER — TELEMEDICINE (OUTPATIENT)
Dept: CARDIOLOGY CLINIC | Facility: CLINIC | Age: 42
End: 2022-03-01
Payer: COMMERCIAL

## 2022-03-01 VITALS
DIASTOLIC BLOOD PRESSURE: 70 MMHG | HEIGHT: 72 IN | BODY MASS INDEX: 30.88 KG/M2 | WEIGHT: 228 LBS | SYSTOLIC BLOOD PRESSURE: 136 MMHG | HEART RATE: 72 BPM

## 2022-03-01 DIAGNOSIS — R00.1 BRADYCARDIA: ICD-10-CM

## 2022-03-01 DIAGNOSIS — R55 SYNCOPE, UNSPECIFIED SYNCOPE TYPE: ICD-10-CM

## 2022-03-01 PROCEDURE — 99244 OFF/OP CNSLTJ NEW/EST MOD 40: CPT | Performed by: INTERNAL MEDICINE

## 2022-03-01 NOTE — H&P (VIEW-ONLY)
EPS Consultation Virtual New Patient Evaluation - Nile Bah 37 y o  male MRN: [id number]    The patient was identified by name and date of birth  He was informed that this is a telemedicine visit and that the visit is being conducted through [address] [place] [address] [facility] and patient was informed that this is a secure, HIPAA-compliant platform  He agrees to proceed     My office door was closed  No one else was in the room  He acknowledged consent and understanding of privacy and security of the video platform  The patient has agreed to participate and understands they can discontinue the visit at any time  Patient is aware this is a billable service  Referring:MARCIO Wilson    CC/HPI:   It was a pleasure to see Nile Bah in our arrhythmia clinic at [place] [address]  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, morbid obesity who presents to discuss bradycardia and syncope  Patient has been seen by physicians in [facility] for recurrent syncope in his teen years  Per patient he was diagnosed with vaso-vagal and started on midodrine  He was asked to increase water and salt intake  He was lost to follow-up  In 2019, he was seen at [facility] for chest and abdominal pain  Cardiac work-up revealed bradycardia in 50s  Due to complaints of near-syncope, ECG performed at primary care had revealed heart rate in 40s and junctional rhythm  He had an ER visit on 2021 with episode of syncope  He had undewent hernia repair on 21  His wife was helping him get-up from his bed to eat and go to bathroom  Patient slumped over so wife laid him on the floor  He was unresponsive for several minutes  His BP was  70/30 mmHg  Heart rate was not checked  When EMS arrived , heart rate was in 40s, BP was 100/60 mmHg  In the ER he had another syncopal episode  At a follow-up visit on 2021, he had reported fatigue and lethargy   Another follow-up visit on 2/15/2022, Deboraha Apgar had revealed heart rate in 40s-50s during triggered events  He was able to get heart rate up to 170 bpm with exercise  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  He has loss 30 lbs with weight loss  He has started concerta for ADHD through his psychiatrist      Patient currently reports feeling dizziness for the past several days  He denies having syncope since his follow-up on 2/15/22  He denies any chest pain, orthopnea, PND or syncope  Past Medical History:  Past Medical History:   Diagnosis Date    Anemia     Anxiety     BMI 37 0-37 9, adult     Bradycardia     CPAP (continuous positive airway pressure) dependence     Pt states he is compliant with use   Depression     Gastritis     GERD (gastroesophageal reflux disease)     History of ileus     Pt states it was caused by antibiotic use   Idiopathic pancreatitis     Irritable bowel syndrome     Sleep apnea     Syncope     in teen years, saw cardiology in [place], given Midrodrine  Diagnosed vasovagal at the time      Thrombosed hemorrhoids     Ventral hernia     Vitamin D deficiency        Medications:      Current Outpatient Medications:     cholecalciferol (VITAMIN D3) 1,000 units tablet, Take 2,000 Units by mouth daily  , Disp: , Rfl:     gabapentin (NEURONTIN) 300 mg capsule, Take 300 mg by mouth 3 (three) times a day, Disp: , Rfl:     Hyoscyamine Sulfate SL 0 125 MG SUBL, Place under the tongue as needed, Disp: , Rfl:     ketoconazole (NIZORAL) 2 % shampoo, , Disp: , Rfl:     LORazepam (ATIVAN) 0 5 mg tablet, Take by mouth every 6 (six) hours as needed for anxiety, Disp: , Rfl:     Methylphenidate HCl ER 36 MG TB24, , Disp: , Rfl:     Multiple Vitamin (Multi Vitamin Daily) TABS, Take by mouth daily, Disp: , Rfl:     oxiconazole (Oxistat) 1 % CREA, , Disp: , Rfl:     pantoprazole (PROTONIX) 40 mg tablet, Take 20 mg by mouth 2 (two) times a day  , Disp: , Rfl:     traMADol (ULTRAM) 50 mg tablet, Take 50 mg by mouth every 6 (six) hours as needed for moderate pain (Pt takes for severe pain ), Disp: , Rfl:     vilazodone (VIIBRYD) 20 mg tablet, Take 30 mg by mouth daily , Disp: , Rfl:     acetaminophen (TYLENOL) 500 mg tablet, Take 1,000 mg by mouth every 6 (six) hours as needed for mild pain (Patient not taking: Reported on 3/1/2022 ), Disp: , Rfl:     ibuprofen (MOTRIN) 400 mg tablet, Take by mouth every 6 (six) hours as needed for mild pain (Patient not taking: Reported on 3/1/2022 ), Disp: , Rfl:     ondansetron (ZOFRAN) 4 mg tablet, Take 4 mg by mouth every 8 (eight) hours as needed for nausea or vomiting (Patient not taking: Reported on 3/1/2022 ), Disp: , Rfl:      History reviewed  No pertinent family history  Social History     Socioeconomic History    Marital status: /Civil Union     Spouse name: Not on file    Number of children: Not on file    Years of education: Not on file    Highest education level: Not on file   Occupational History    Not on file   Tobacco Use    Smoking status: Never Smoker    Smokeless tobacco: Never Used   Vaping Use    Vaping Use: Never used   Substance and Sexual Activity    Alcohol use: Yes     Comment: rarely    Drug use: Not Currently    Sexual activity: Not on file     Comment: defer   Other Topics Concern    Not on file   Social History Narrative    Not on file     Social Determinants of Health     Financial Resource Strain: Not on file   Food Insecurity: Not on file   Transportation Needs: Not on file   Physical Activity: Not on file   Stress: Not on file   Social Connections: Not on file   Intimate Partner Violence: Not on file   Housing Stability: Not on file     Social History     Tobacco Use   Smoking Status Never Smoker   Smokeless Tobacco Never Used     Social History     Substance and Sexual Activity   Alcohol Use Yes    Comment: rarely       Review of Systems   Constitutional: Negative for chills and fever  HENT: Negative  Eyes: Negative for blurred vision and double vision  Cardiovascular: Positive for near-syncope and syncope  Negative for chest pain, dyspnea on exertion, leg swelling, orthopnea, palpitations and paroxysmal nocturnal dyspnea  Respiratory: Negative for cough and sputum production  Endocrine: Negative  Skin: Negative  Negative for rash  Musculoskeletal: Negative  Negative for arthritis and joint pain  Gastrointestinal: Negative for abdominal pain, nausea and vomiting  Genitourinary: Negative  Neurological: Positive for dizziness  Negative for light-headedness  Psychiatric/Behavioral: Negative  The patient is not nervous/anxious  Objective:     Vitals: Blood pressure 136/70, pulse 72, height 6' (1 829 m), weight 103 kg (228 lb)  , Body mass index is 30 92 kg/m²  ,        Physical Exam:    GEN: Tobias Just appears well, alert and oriented x 3, pleasant and cooperative   HEENT: extraocular muscles intact  NECK: supple  NEURO: no focal findings   SKIN: normal without suspicious lesions on exposed skin      Labs & Results:  Below is the patient's most recent value for Albumin, ALT, AST, BUN, Calcium, Chloride, Cholesterol, CO2, Creatinine, GFR, Glucose, HDL, Hematocrit, Hemoglobin, Hemoglobin A1C, LDL, Magnesium, Phosphorus, Platelets, Potassium, PSA, Sodium, Triglycerides, and WBC  Lab Results   Component Value Date    ALT 31 11/08/2021    AST 14 11/08/2021    BUN 8 11/11/2021    CALCIUM 8 2 (L) 11/11/2021     11/11/2021    CO2 28 11/11/2021    CREATININE 0 85 11/11/2021    HCT 28 5 (L) 11/11/2021    HGB 9 3 (L) 11/11/2021    MG 2 1 11/10/2021     11/11/2021    K 3 4 (L) 11/11/2021    WBC 6 39 11/11/2021     Note: for a comprehensive list of the patient's lab results, access the Results Review activity  Cardiac testing:       Echocardiograms:  No results found for this or any previous visit      No results found for this or any previous visit  Catheterizations:   No results found for this or any previous visit  Stress Tests:  No results found for this or any previous visit  Joanna 12/1/2021 - 12/15/2021:      ASSESSMENT/PLAN:  1  Syncope  Patient has significant bradycardia in 40s-50s possibly leading to syncope   However he understands if his syncope is due to vaso-vagal, he may continue to have episodes once heart rate is corrected  Given his junction rhythm in 40s-50 during awake hours, he has sinus node dysfunction  We discussed implanting pacemaker to correct his bradycardia  After answering all the questions he opted to proceed with procedure  Our office will in touch with him to schedule the procedure  He will continue to stay hydrated, and keeping higher level of salt    VIRTUAL VISIT DISCLAIMER     Ancelmo Cortez acknowledges that He has consented to an online visit or consultation  He understands that the online visit is based solely on information provided by him, and that, in the absence of a face-to-face physical evaluation by the physician, the diagnosis He receives is both limited and provisional in terms of accuracy and completeness  This is not intended to replace a full medical face-to-face evaluation by the physician  Blessing Padilla understands and accepts these terms

## 2022-03-01 NOTE — PROGRESS NOTES
EPS Consultation Virtual New Patient Evaluation - Isidoro Solorio 37 y o  male MRN: [id number]    The patient was identified by name and date of birth  He was informed that this is a telemedicine visit and that the visit is being conducted through [address] [facility] and patient was informed that this is a secure, HIPAA-compliant platform  He agrees to proceed     My office door was closed  No one else was in the room  He acknowledged consent and understanding of privacy and security of the video platform  The patient has agreed to participate and understands they can discontinue the visit at any time  Patient is aware this is a billable service  Referring:MARCIO Wilson    CC/HPI:   It was a pleasure to see Isidoro Solorio in our arrhythmia clinic at [place] [address]  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, morbid obesity who presents to discuss bradycardia and syncope  Patient has been seen by physicians in [facility] for recurrent syncope in his teen years  Per patient he was diagnosed with vaso-vagal and started on midodrine  He was asked to increase water and salt intake  He was lost to follow-up  In 2019, he was seen at [facility] for chest and abdominal pain  Cardiac work-up revealed bradycardia in 50s  Due to complaints of near-syncope, ECG performed at primary care had revealed heart rate in 40s and junctional rhythm  He had an ER visit on 11/8/2021 with episode of syncope  He had undewent hernia repair on 11/7/21  His wife was helping him get-up from his bed to eat and go to bathroom  Patient slumped over so wife laid him on the floor  He was unresponsive for several minutes  His BP was  70/30 mmHg  Heart rate was not checked  When EMS arrived , heart rate was in 40s, BP was 100/60 mmHg  In the ER he had another syncopal episode  At a follow-up visit on 12/13/2021, he had reported fatigue and lethargy   Another follow-up visit on 2/15/2022, Ervin Wynn had revealed heart rate in 40s-50s during triggered events  He was able to get heart rate up to 170 bpm with exercise  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  He has loss 30 lbs with weight loss  He has started concerta for ADHD through his psychiatrist      Patient currently reports feeling dizziness for the past several days  He denies having syncope since his follow-up on 2/15/22  He denies any chest pain, orthopnea, PND or syncope  Past Medical History:  Past Medical History:   Diagnosis Date    Anemia     Anxiety     BMI 37 0-37 9, adult     Bradycardia     CPAP (continuous positive airway pressure) dependence     Pt states he is compliant with use   Depression     Gastritis     GERD (gastroesophageal reflux disease)     History of ileus     Pt states it was caused by antibiotic use   Idiopathic pancreatitis     Irritable bowel syndrome     Sleep apnea     Syncope     in teen years, saw cardiology in [place], given Midrodrine  Diagnosed vasovagal at the time      Thrombosed hemorrhoids     Ventral hernia     Vitamin D deficiency        Medications:      Current Outpatient Medications:     cholecalciferol (VITAMIN D3) 1,000 units tablet, Take 2,000 Units by mouth daily  , Disp: , Rfl:     gabapentin (NEURONTIN) 300 mg capsule, Take 300 mg by mouth 3 (three) times a day, Disp: , Rfl:     Hyoscyamine Sulfate SL 0 125 MG SUBL, Place under the tongue as needed, Disp: , Rfl:     ketoconazole (NIZORAL) 2 % shampoo, , Disp: , Rfl:     LORazepam (ATIVAN) 0 5 mg tablet, Take by mouth every 6 (six) hours as needed for anxiety, Disp: , Rfl:     Methylphenidate HCl ER 36 MG TB24, , Disp: , Rfl:     Multiple Vitamin (Multi Vitamin Daily) TABS, Take by mouth daily, Disp: , Rfl:     oxiconazole (Oxistat) 1 % CREA, , Disp: , Rfl:     pantoprazole (PROTONIX) 40 mg tablet, Take 20 mg by mouth 2 (two) times a day  , Disp: , Rfl:     traMADol (ULTRAM) 50 mg tablet, Take 50 mg by mouth every 6 (six) hours as needed for moderate pain (Pt takes for severe pain ), Disp: , Rfl:     vilazodone (VIIBRYD) 20 mg tablet, Take 30 mg by mouth daily , Disp: , Rfl:     acetaminophen (TYLENOL) 500 mg tablet, Take 1,000 mg by mouth every 6 (six) hours as needed for mild pain (Patient not taking: Reported on 3/1/2022 ), Disp: , Rfl:     ibuprofen (MOTRIN) 400 mg tablet, Take by mouth every 6 (six) hours as needed for mild pain (Patient not taking: Reported on 3/1/2022 ), Disp: , Rfl:     ondansetron (ZOFRAN) 4 mg tablet, Take 4 mg by mouth every 8 (eight) hours as needed for nausea or vomiting (Patient not taking: Reported on 3/1/2022 ), Disp: , Rfl:      History reviewed  No pertinent family history  Social History     Socioeconomic History    Marital status: /Civil Union     Spouse name: Not on file    Number of children: Not on file    Years of education: Not on file    Highest education level: Not on file   Occupational History    Not on file   Tobacco Use    Smoking status: Never Smoker    Smokeless tobacco: Never Used   Vaping Use    Vaping Use: Never used   Substance and Sexual Activity    Alcohol use: Yes     Comment: rarely    Drug use: Not Currently    Sexual activity: Not on file     Comment: defer   Other Topics Concern    Not on file   Social History Narrative    Not on file     Social Determinants of Health     Financial Resource Strain: Not on file   Food Insecurity: Not on file   Transportation Needs: Not on file   Physical Activity: Not on file   Stress: Not on file   Social Connections: Not on file   Intimate Partner Violence: Not on file   Housing Stability: Not on file     Social History     Tobacco Use   Smoking Status Never Smoker   Smokeless Tobacco Never Used     Social History     Substance and Sexual Activity   Alcohol Use Yes    Comment: rarely       Review of Systems   Constitutional: Negative for chills and fever  HENT: Negative  Eyes: Negative for blurred vision and double vision  Cardiovascular: Positive for near-syncope and syncope  Negative for chest pain, dyspnea on exertion, leg swelling, orthopnea, palpitations and paroxysmal nocturnal dyspnea  Respiratory: Negative for cough and sputum production  Endocrine: Negative  Skin: Negative  Negative for rash  Musculoskeletal: Negative  Negative for arthritis and joint pain  Gastrointestinal: Negative for abdominal pain, nausea and vomiting  Genitourinary: Negative  Neurological: Positive for dizziness  Negative for light-headedness  Psychiatric/Behavioral: Negative  The patient is not nervous/anxious  Objective:     Vitals: Blood pressure 136/70, pulse 72, height 6' (1 829 m), weight 103 kg (228 lb)  , Body mass index is 30 92 kg/m²  ,        Physical Exam:    GEN: Elvera Lab appears well, alert and oriented x 3, pleasant and cooperative   HEENT: extraocular muscles intact  NECK: supple  NEURO: no focal findings   SKIN: normal without suspicious lesions on exposed skin      Labs & Results:  Below is the patient's most recent value for Albumin, ALT, AST, BUN, Calcium, Chloride, Cholesterol, CO2, Creatinine, GFR, Glucose, HDL, Hematocrit, Hemoglobin, Hemoglobin A1C, LDL, Magnesium, Phosphorus, Platelets, Potassium, PSA, Sodium, Triglycerides, and WBC  Lab Results   Component Value Date    ALT 31 11/08/2021    AST 14 11/08/2021    BUN 8 11/11/2021    CALCIUM 8 2 (L) 11/11/2021     11/11/2021    CO2 28 11/11/2021    CREATININE 0 85 11/11/2021    HCT 28 5 (L) 11/11/2021    HGB 9 3 (L) 11/11/2021    MG 2 1 11/10/2021     11/11/2021    K 3 4 (L) 11/11/2021    WBC 6 39 11/11/2021     Note: for a comprehensive list of the patient's lab results, access the Results Review activity  Cardiac testing:       Echocardiograms:  No results found for this or any previous visit      No results found for this or any previous visit  Catheterizations:   No results found for this or any previous visit  Stress Tests:  No results found for this or any previous visit  Joanna 12/1/2021 - 12/15/2021:      ASSESSMENT/PLAN:  1  Syncope  Patient has significant bradycardia in 40s-50s possibly leading to syncope   However he understands if his syncope is due to vaso-vagal, he may continue to have episodes once heart rate is corrected  Given his junction rhythm in 40s-50 during awake hours, he has sinus node dysfunction  We discussed implanting pacemaker to correct his bradycardia  After answering all the questions he opted to proceed with procedure  Our office will in touch with him to schedule the procedure  He will continue to stay hydrated, and keeping higher level of salt    VIRTUAL VISIT DISCLAIMER     Ancelmo ANCHO Verdugo acknowledges that He has consented to an online visit or consultation  He understands that the online visit is based solely on information provided by him, and that, in the absence of a face-to-face physical evaluation by the physician, the diagnosis He receives is both limited and provisional in terms of accuracy and completeness  This is not intended to replace a full medical face-to-face evaluation by the physician  Kwabena Lopez understands and accepts these terms

## 2022-03-02 ENCOUNTER — PREP FOR PROCEDURE (OUTPATIENT)
Dept: CARDIOLOGY CLINIC | Facility: CLINIC | Age: 42
End: 2022-03-02

## 2022-03-02 ENCOUNTER — TELEPHONE (OUTPATIENT)
Dept: CARDIOLOGY CLINIC | Facility: CLINIC | Age: 42
End: 2022-03-02

## 2022-03-02 DIAGNOSIS — R55 SYNCOPE, UNSPECIFIED SYNCOPE TYPE: Primary | ICD-10-CM

## 2022-03-02 NOTE — TELEPHONE ENCOUNTER
Pt is schedule for their Dual Chamber Pacemaker on 3/18/2022 with Dr Russell at [facility]    · No meds to be on hold    · No Allergies    · Pt is aware of the general instructions     · Pt is aware to get his labs done prior to the procedure  Can we get an Auth?

## 2022-03-02 NOTE — TELEPHONE ENCOUNTER
----- Message from Masha Staley MD sent at 3/1/2022  2:59 PM EST -----  Regarding: ppm  Hi guys,    Please set-up PPM implant procedure in coming weeks  If you don't find time in coming weeks, then let me know  Dual chamber    [employer]    No hold on medications    Routine labs    Prudence Willett - please put in case request    Thank you

## 2022-03-08 ENCOUNTER — TELEPHONE (OUTPATIENT)
Dept: CARDIOLOGY CLINIC | Facility: CLINIC | Age: 42
End: 2022-03-08

## 2022-03-08 NOTE — TELEPHONE ENCOUNTER
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

## 2022-03-08 NOTE — TELEPHONE ENCOUNTER
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

## 2022-03-15 ENCOUNTER — CLINICAL SUPPORT (OUTPATIENT)
Dept: NUTRITION | Facility: CLINIC | Age: 42
End: 2022-03-15
Payer: COMMERCIAL

## 2022-03-15 VITALS — BODY MASS INDEX: 30.76 KG/M2 | WEIGHT: 226.8 LBS

## 2022-03-15 DIAGNOSIS — K90.0 CELIAC DISEASE: ICD-10-CM

## 2022-03-15 PROCEDURE — 97803 MED NUTRITION INDIV SUBSEQ: CPT | Performed by: DIETITIAN, REGISTERED

## 2022-03-15 NOTE — PROGRESS NOTES
Follow-Up Nutrition Assessment Form    Patient Name: Madeline Ayala    YOB: 1980    Sex: Male      Follow Up Date: 3/15/2022  Start Time: 12:50 PM Stop Time: 1:20 PM Total Minutes: 30 min     Data:  Present at session: self   Parent/Patient Concerns: "I am happy with my weight loss, now I just want to maintain "   Medical Dx/Reason for Referral: Obesity, Celiac Disease   Past Medical History:   Diagnosis Date    Anemia     Anxiety     BMI 37 0-37 9, adult     Bradycardia     CPAP (continuous positive airway pressure) dependence     Pt states he is compliant with use   Depression     Gastritis     GERD (gastroesophageal reflux disease)     History of ileus     Pt states it was caused by antibiotic use   Idiopathic pancreatitis     Irritable bowel syndrome     Sleep apnea     Syncope     in teen years, saw cardiology in [place], given Midrodrine  Diagnosed vasovagal at the time      Thrombosed hemorrhoids     Ventral hernia     Vitamin D deficiency        Current Outpatient Medications   Medication Sig Dispense Refill    acetaminophen (TYLENOL) 500 mg tablet Take 1,000 mg by mouth every 6 (six) hours as needed for mild pain (Patient not taking: Reported on 3/1/2022 )      cholecalciferol (VITAMIN D3) 1,000 units tablet Take 2,000 Units by mouth daily        gabapentin (NEURONTIN) 300 mg capsule Take 300 mg by mouth 3 (three) times a day      Hyoscyamine Sulfate SL 0 125 MG SUBL Place under the tongue as needed      ibuprofen (MOTRIN) 400 mg tablet Take by mouth every 6 (six) hours as needed for mild pain (Patient not taking: Reported on 3/1/2022 )      ketoconazole (NIZORAL) 2 % shampoo       LORazepam (ATIVAN) 0 5 mg tablet Take by mouth every 6 (six) hours as needed for anxiety      Methylphenidate HCl ER 36 MG TB24       Multiple Vitamin (Multi Vitamin Daily) TABS Take by mouth daily      ondansetron (ZOFRAN) 4 mg tablet Take 4 mg by mouth every 8 (eight) hours as needed for nausea or vomiting (Patient not taking: Reported on 3/1/2022 )      oxiconazole (Oxistat) 1 % CREA       pantoprazole (PROTONIX) 40 mg tablet Take 20 mg by mouth 2 (two) times a day        traMADol (ULTRAM) 50 mg tablet Take 50 mg by mouth every 6 (six) hours as needed for moderate pain (Pt takes for severe pain )      vilazodone (VIIBRYD) 20 mg tablet Take 30 mg by mouth daily        No current facility-administered medications for this visit  Additional Meds/Supplements: Continuing to take Vitamin D 2000 IU + flintstone's multivtamin   Barriers to Learning: None   Labs:    Height: Ht Readings from Last 3 Encounters:   03/01/22 6' (1 829 m)   02/15/22 6' (1 829 m)   12/13/21 6' (1 829 m)      Weight: Wt Readings from Last 10 Encounters:   03/01/22 103 kg (228 lb)   02/15/22 106 kg (233 lb 3 2 oz)   12/28/21 117 kg (259 lb)   12/13/21 121 kg (267 lb)   11/18/21 126 kg (277 lb)   11/09/21 (!) 141 kg (310 lb)   11/08/21 126 kg (278 lb)   03/18/20 127 kg (279 lb 12 2 oz)   02/10/20 133 kg (294 lb)     Estimated body mass index is 30 92 kg/m² as calculated from the following:    Height as of 3/1/22: 6' (1 829 m)  Weight as of 3/1/22: 103 kg (228 lb)  Wt  Change Since Last Visit: [x]Yes     []No  Amount: Pt successfully lost 51lb since initial appointment on 11/18/21  Energy Needs: [facility] Equation: 2400 - 2900 kcal (mifflin x 1 5 activity minus 750 for 1 5 weight loss to maintenance per week)  2400 - 2900 ml (1 ml/kcal)  90 - 100 g PRO (1 - 1 2 g/kg IBW)  ~35 g fiber/day  75 - 90 g fat/day (weight loss to weight mainteance)   Pain Screen: Are you having pain now? No      Previous Goals or Goals Achieved:  12/28/21: Pt successfully lost 18lb since initial appointment within approximately 1 mo  Pt has decreased gluten consumption greatly, has noticed that his BMs are better controlled and because of this is more willing to strictly follow GF regimen   Does report consuming foods that have been in contact with gluten, says he minimizes this though does not want to completely avoid this so he can still eat some of his favorite foods  Pt reports following regimented routine: Breakast at 4-6 AM is 1 cup (5 servings) egg whites with salsa or GF cereal with skim milk, Lunch packed by wife such as salad with spinach, butternut squash, chicken, Dinner: also prepared by wife of similar variety to lunch  Snacks in between meals at 6-9 AM, 9-12 AM, 4:30-9 PM, likes to have fruit or protein bar or [employer] Good greek yogurt  Drinks ensure max protein occasionally as snack  Pt has minimal intake of grains in his diet, is not entirely sure of varieties he can have that are gluten free  Would not like to include bread, "I don't really miss it " and doesn't like the taste of GF bread  Reports tolerance of regular cow's milk and has been including skim milk in his diet, avoiding cheese at this time due to concern of causing constipation  Pt has been including regular exercise q 2-3 days though this varies depending upon work schedule  3/15/22: Pt has successfully lost 51lb, his goal was to lose 50lb total and has met this goal, now desiring to maintain  Reports if he loses more weight would like to weigh between 185 - 210lb which is appropriate (BMI 25-28 range)  Pt inquiring about appropriate macronutrient amounts to maintain and/or continue with slight weight loss to appropriate BMI  New Goals:      1  Pt to maintain weight loss of 51lb total upon follow up  2  Pt to continue to jog 1-2 miles per day q 2-3 days  Initial PES:   Food and nutrition related knowledge deficit  related to newly dx Celiac's disease as evidenced by consumption of gluten in current diet/pt report    New PES: No Change      New Problem List:  1   N/A   2    3          Medical Nutrition Therapy Intervention:  [x]Individualized Meal Plan--Provided with appropriate macronutrient amounts as listed above which pt likes to track using melina and pen/paper  Finds it helpful to be following 6 small meals per day, pt to continue with this  []Understanding Lab Values   []Basic Pathophysiology of Disease []Food/Medication Interactions   []Food Diary []Exercise   []Lifestyle/Behavior Modification Techniques []Medication, Mechanism of Action   []Label Reading []Self Blood Glucose Monitoring   [x]Weight/BMI Goals--Reviewed appropriate weight loss goals/weight range, 185 - 210lb appropriate  Congratulated pt on successful weight loss thus far  []Other -   Sample 2200 kcal meal plan provided     Other Notes:        Comprehension: []Excellent  [x]Very Good  []Good  []Fair   []Poor    Receptivity: []Excellent  [x]Very Good  []Good  []Fair   []Poor    Expected Compliance: []Excellent  [x]Very Good  []Good  []Fair   []Poor      Labs:  CMP  Lab Results   Component Value Date    K 3 4 (L) 11/11/2021     11/11/2021    CO2 28 11/11/2021    BUN 8 11/11/2021    CREATININE 0 85 11/11/2021    CALCIUM 8 2 (L) 11/11/2021    AST 14 11/08/2021    ALT 31 11/08/2021    ALKPHOS 109 11/08/2021    EGFR 109 11/11/2021       BMP  Lab Results   Component Value Date    CALCIUM 8 2 (L) 11/11/2021    K 3 4 (L) 11/11/2021    CO2 28 11/11/2021     11/11/2021    BUN 8 11/11/2021    CREATININE 0 85 11/11/2021       Lipids  No results found for: CHOL  No results found for: HDL  No results found for: LDLCALC  No results found for: TRIG  No results found for: CHOLHDL    Hemoglobin A1C  No results found for: HGBA1C    Fasting Glucose  No results found for: GLUF    Insulin     Thyroid  No results found for: TSH, F9LQNHX, X3GDOAY, THYROIDAB    Hepatic Function Panel  Lab Results   Component Value Date    ALT 31 11/08/2021    AST 14 11/08/2021    ALKPHOS 109 11/08/2021       Celiac Disease Antibody Panel  No results found for: ENDOMYSIAL IGA, GLIADIN IGA, GLIADIN IGG, IGA, TISSUE TRANSGLUT AB, TTG IGA   Iron  No results found for: IRON, TIBC, FERRITIN Vitamins  11/23/21 vitamin B12 342, folate 14 7     Abebe Andre RD, LDN  [facility] CLIN[facility]UTR[facility]  [address] [place] [id number]

## 2022-03-18 ENCOUNTER — ANESTHESIA (OUTPATIENT)
Dept: NON INVASIVE DIAGNOSTICS | Facility: HOSPITAL | Age: 42
End: 2022-03-18
Payer: COMMERCIAL

## 2022-03-18 ENCOUNTER — ANESTHESIA EVENT (OUTPATIENT)
Dept: NON INVASIVE DIAGNOSTICS | Facility: HOSPITAL | Age: 42
End: 2022-03-18
Payer: COMMERCIAL

## 2022-03-18 ENCOUNTER — APPOINTMENT (OUTPATIENT)
Dept: RADIOLOGY | Facility: HOSPITAL | Age: 42
End: 2022-03-18
Payer: COMMERCIAL

## 2022-03-18 ENCOUNTER — HOSPITAL ENCOUNTER (OUTPATIENT)
Facility: HOSPITAL | Age: 42
Setting detail: OUTPATIENT SURGERY
Discharge: HOME | End: 2022-03-18
Attending: INTERNAL MEDICINE | Admitting: INTERNAL MEDICINE
Payer: COMMERCIAL

## 2022-03-18 ENCOUNTER — DOCUMENTATION (OUTPATIENT)
Dept: CARDIOLOGY CLINIC | Facility: CLINIC | Age: 42
End: 2022-03-18

## 2022-03-18 VITALS
HEIGHT: 72 IN | WEIGHT: 223.8 LBS | RESPIRATION RATE: 18 BRPM | DIASTOLIC BLOOD PRESSURE: 83 MMHG | OXYGEN SATURATION: 95 % | TEMPERATURE: 97.8 F | BODY MASS INDEX: 30.31 KG/M2 | HEART RATE: 79 BPM | SYSTOLIC BLOOD PRESSURE: 118 MMHG

## 2022-03-18 DIAGNOSIS — R00.1 BRADYCARDIA: Primary | ICD-10-CM

## 2022-03-18 DIAGNOSIS — R55 SYNCOPE, UNSPECIFIED SYNCOPE TYPE: ICD-10-CM

## 2022-03-18 LAB
ANION GAP SERPL CALCULATED.3IONS-SCNC: 6 MMOL/L (ref 4–13)
ATRIAL RATE: 57 BPM
ATRIAL RATE: 70 BPM
BUN SERPL-MCNC: 13 MG/DL (ref 5–25)
CALCIUM SERPL-MCNC: 9.4 MG/DL (ref 8.3–10.1)
CHLORIDE SERPL-SCNC: 109 MMOL/L (ref 100–108)
CO2 SERPL-SCNC: 26 MMOL/L (ref 21–32)
CREAT SERPL-MCNC: 0.85 MG/DL (ref 0.6–1.3)
ERYTHROCYTE [DISTWIDTH] IN BLOOD BY AUTOMATED COUNT: 13.6 % (ref 11.6–15.1)
GFR SERPL CREATININE-BSD FRML MDRD: 108 ML/MIN/1.73SQ M
GLUCOSE P FAST SERPL-MCNC: 96 MG/DL (ref 65–99)
GLUCOSE SERPL-MCNC: 96 MG/DL (ref 65–140)
HCT VFR BLD AUTO: 42.2 % (ref 36.5–49.3)
HGB BLD-MCNC: 14.2 G/DL (ref 12–17)
INR PPP: 1.04 (ref 0.84–1.19)
MCH RBC QN AUTO: 27.7 PG (ref 26.8–34.3)
MCHC RBC AUTO-ENTMCNC: 33.6 G/DL (ref 31.4–37.4)
MCV RBC AUTO: 82 FL (ref 82–98)
P AXIS: -10 DEGREES
P AXIS: -9 DEGREES
PLATELET # BLD AUTO: 325 THOUSANDS/UL (ref 149–390)
PMV BLD AUTO: 10.4 FL (ref 8.9–12.7)
POTASSIUM SERPL-SCNC: 3.7 MMOL/L (ref 3.5–5.3)
PR INTERVAL: 152 MS
PR INTERVAL: 204 MS
PROTHROMBIN TIME: 13.2 SECONDS (ref 11.6–14.5)
QRS AXIS: 24 DEGREES
QRS AXIS: 32 DEGREES
QRSD INTERVAL: 102 MS
QRSD INTERVAL: 108 MS
QT INTERVAL: 392 MS
QT INTERVAL: 418 MS
QTC INTERVAL: 406 MS
QTC INTERVAL: 423 MS
RBC # BLD AUTO: 5.12 MILLION/UL (ref 3.88–5.62)
SODIUM SERPL-SCNC: 141 MMOL/L (ref 136–145)
T WAVE AXIS: 26 DEGREES
T WAVE AXIS: 36 DEGREES
VENTRICULAR RATE: 57 BPM
VENTRICULAR RATE: 70 BPM
WBC # BLD AUTO: 7.14 THOUSAND/UL (ref 4.31–10.16)

## 2022-03-18 PROCEDURE — C1892 INTRO/SHEATH,FIXED,PEEL-AWAY: HCPCS | Performed by: INTERNAL MEDICINE

## 2022-03-18 PROCEDURE — 76937 US GUIDE VASCULAR ACCESS: CPT | Performed by: INTERNAL MEDICINE

## 2022-03-18 PROCEDURE — 80048 BASIC METABOLIC PNL TOTAL CA: CPT | Performed by: PHYSICIAN ASSISTANT

## 2022-03-18 PROCEDURE — C1894 INTRO/SHEATH, NON-LASER: HCPCS | Performed by: INTERNAL MEDICINE

## 2022-03-18 PROCEDURE — 85027 COMPLETE CBC AUTOMATED: CPT | Performed by: PHYSICIAN ASSISTANT

## 2022-03-18 PROCEDURE — C1769 GUIDE WIRE: HCPCS | Performed by: INTERNAL MEDICINE

## 2022-03-18 PROCEDURE — C1785 PMKR, DUAL, RATE-RESP: HCPCS | Performed by: INTERNAL MEDICINE

## 2022-03-18 PROCEDURE — 85610 PROTHROMBIN TIME: CPT | Performed by: PHYSICIAN ASSISTANT

## 2022-03-18 PROCEDURE — C1898 LEAD, PMKR, OTHER THAN TRANS: HCPCS | Performed by: INTERNAL MEDICINE

## 2022-03-18 PROCEDURE — 93005 ELECTROCARDIOGRAM TRACING: CPT

## 2022-03-18 PROCEDURE — 93010 ELECTROCARDIOGRAM REPORT: CPT | Performed by: INTERNAL MEDICINE

## 2022-03-18 PROCEDURE — 33208 INSRT HEART PM ATRIAL & VENT: CPT | Performed by: INTERNAL MEDICINE

## 2022-03-18 PROCEDURE — 71045 X-RAY EXAM CHEST 1 VIEW: CPT

## 2022-03-18 DEVICE — ENVELOPE CMRM6122 ABSORB MED MR
Type: IMPLANTABLE DEVICE | Site: CHEST  WALL | Status: FUNCTIONAL
Brand: TYRX™

## 2022-03-18 DEVICE — LEAD 5076-52 MRI US RCMCRD
Type: IMPLANTABLE DEVICE | Site: HEART | Status: FUNCTIONAL
Brand: CAPSUREFIX NOVUS MRI™ SURESCAN®

## 2022-03-18 DEVICE — LEAD 383069 MRI US
Type: IMPLANTABLE DEVICE | Site: HEART | Status: FUNCTIONAL
Brand: SELECTSECURE™ MRI SURESCAN™

## 2022-03-18 DEVICE — IPG W1DR01 AZURE XT DR MRI USA
Type: IMPLANTABLE DEVICE | Site: CHEST  WALL | Status: FUNCTIONAL
Brand: AZURE™ XT DR MRI SURESCAN™

## 2022-03-18 RX ORDER — LIDOCAINE HYDROCHLORIDE 10 MG/ML
INJECTION, SOLUTION EPIDURAL; INFILTRATION; INTRACAUDAL; PERINEURAL AS NEEDED
Status: DISCONTINUED | OUTPATIENT
Start: 2022-03-18 | End: 2022-03-18 | Stop reason: HOSPADM

## 2022-03-18 RX ORDER — OXYCODONE HYDROCHLORIDE 5 MG/1
5 TABLET ORAL EVERY 6 HOURS PRN
Status: DISCONTINUED | OUTPATIENT
Start: 2022-03-18 | End: 2022-03-18 | Stop reason: HOSPADM

## 2022-03-18 RX ORDER — CEFAZOLIN SODIUM 2 G/50ML
2000 SOLUTION INTRAVENOUS ONCE
Status: COMPLETED | OUTPATIENT
Start: 2022-03-18 | End: 2022-03-18

## 2022-03-18 RX ORDER — GENTAMICIN SULFATE 40 MG/ML
INJECTION, SOLUTION INTRAMUSCULAR; INTRAVENOUS AS NEEDED
Status: DISCONTINUED | OUTPATIENT
Start: 2022-03-18 | End: 2022-03-18 | Stop reason: HOSPADM

## 2022-03-18 RX ORDER — MIDAZOLAM HYDROCHLORIDE 2 MG/2ML
INJECTION, SOLUTION INTRAMUSCULAR; INTRAVENOUS AS NEEDED
Status: DISCONTINUED | OUTPATIENT
Start: 2022-03-18 | End: 2022-03-18

## 2022-03-18 RX ORDER — FENTANYL CITRATE 50 UG/ML
INJECTION, SOLUTION INTRAMUSCULAR; INTRAVENOUS AS NEEDED
Status: DISCONTINUED | OUTPATIENT
Start: 2022-03-18 | End: 2022-03-18

## 2022-03-18 RX ORDER — SODIUM CHLORIDE 9 MG/ML
INJECTION, SOLUTION INTRAVENOUS CONTINUOUS PRN
Status: DISCONTINUED | OUTPATIENT
Start: 2022-03-18 | End: 2022-03-18

## 2022-03-18 RX ORDER — ACETAMINOPHEN 325 MG/1
650 TABLET ORAL EVERY 4 HOURS PRN
Status: DISCONTINUED | OUTPATIENT
Start: 2022-03-18 | End: 2022-03-18 | Stop reason: HOSPADM

## 2022-03-18 RX ORDER — PROPOFOL 10 MG/ML
INJECTION, EMULSION INTRAVENOUS CONTINUOUS PRN
Status: DISCONTINUED | OUTPATIENT
Start: 2022-03-18 | End: 2022-03-18

## 2022-03-18 RX ADMIN — MIDAZOLAM 2 MG: 1 INJECTION INTRAMUSCULAR; INTRAVENOUS at 11:09

## 2022-03-18 RX ADMIN — PROPOFOL 70 MCG/KG/MIN: 10 INJECTION, EMULSION INTRAVENOUS at 10:46

## 2022-03-18 RX ADMIN — SODIUM CHLORIDE: 0.9 INJECTION, SOLUTION INTRAVENOUS at 10:39

## 2022-03-18 RX ADMIN — FENTANYL CITRATE 25 MCG: 50 INJECTION INTRAMUSCULAR; INTRAVENOUS at 10:55

## 2022-03-18 RX ADMIN — CEFAZOLIN SODIUM 2000 MG: 2 SOLUTION INTRAVENOUS at 10:49

## 2022-03-18 RX ADMIN — MIDAZOLAM 2 MG: 1 INJECTION INTRAMUSCULAR; INTRAVENOUS at 10:41

## 2022-03-18 RX ADMIN — ACETAMINOPHEN 650 MG: 325 TABLET ORAL at 13:08

## 2022-03-18 RX ADMIN — FENTANYL CITRATE 25 MCG: 50 INJECTION INTRAMUSCULAR; INTRAVENOUS at 11:09

## 2022-03-18 RX ADMIN — FENTANYL CITRATE 50 MCG: 50 INJECTION INTRAMUSCULAR; INTRAVENOUS at 10:44

## 2022-03-18 NOTE — Clinical Note
Prepped: left chest  Prepped with: ChloraPrep  The site was clipped  The patient was draped   Bilateral wrist wraps applied for patient safety

## 2022-03-18 NOTE — Clinical Note
The Merari Jaeger Brain device was inserted  The leads were placed into the connector and visually verified to be in correct position  Injury current obtained

## 2022-03-18 NOTE — DISCHARGE INSTRUCTIONS
Please refer to post device implantation discharge instructions and restrictions below and your device booklet/temporary card  Keep incision dry for one week  Do not use lotions, powders, ointments, or creams on the incision  Leave outer bandage in place for one week  The bandage is water proof  You may shower with it as long as it is fully adhered to your skin  If the bandage appears to be coming off or if there is an open gap in the bandage to the area of your incision, then please keep the whole area dry for the remaining week  After one week, please remove the bandage by gently pulling all edges away from the center and slowly remove it from your skin  Do not quickly rip off the bandage  No overhead reaching, pushing, or pulling with your left arm for 6 weeks  No lifting greater than 10 lbs with your left arm for 6 weeks  No driving for 48 hours  Please call the office if you notice redness, swelling, bleeding, or drainage from incision or if you develop fevers  AFTER PACEMAKER CARE:    If you have any questions, please call [phone] to speak with a nurse (8:30am-4pm, or [phone] after hours)  For appointments, please call [phone]  WHAT YOU SHOULD KNOW:   A pacemaker is a small, battery-powered device that is placed under your skin in your upper chest area with wires placed through a vein that lead directly into the heart  It helps regulate your heart rate and prevent your heart from beating too slowly  AFTER YOU LEAVE:     Medicines:     · Pain medicine: You may need medicine to take away or decrease pain  ¨ Learn how to take your medicine  Ask what medicine and how much you should take  Be sure you know how, when, and how often to take it  Usually Over the counter pain medicine is sufficient to control pain (Acetominophen or Ibuprofen) Ask your doctor if you may take these   If this does not control your pain, narcotic pain killers may be prescribed, please call if you need prescription  ¨ Do not wait until the pain is severe before you take your medicine  Tell caregivers if your pain does not decrease  ¨ Pain medicine can make you dizzy or sleepy  Prevent falls by calling someone when you get out of bed or if you need help  Take your medicine as directed  Call your healthcare provider if you think your medicine is not helping or if you have side effects  Tell him if you are allergic to any medicine  Follow up with your cardiologist after your procedure: You will need a follow-up visit approximately 2 weeks after you leave the hospital  Your cardiologist will check your wound and make sure that your pacemaker is working correctly  Follow the instructions to check your pacemaker: Your cardiologist or primary healthcare provider will check your pacemaker and the battery regularly  He will use a computer to check your pacemaker over the telephone or wireless device which will be given to you  Pacemaker batteries usually last 8 to 10 years  The pacemaker unit will be replaced when the battery gets low  This is a simpler procedure than the original one to implant your pacemaker  Wound care:  Keep your incision dry for one week  Do not use lotions/powders/creams on incision  Leave outer bandage in place for 1 week - it is water proof, and as long as it is fully adhered to your skin you may shower with it  If it appears as though the bandage is coming off and/or there is any communication to the area of device incision, please then keep the whole area dry for the remaining week  After 1 week, please remove by pulling all edges away from the center of the bandage  Please call the office if you notice redness, swelling, bleeding, or drainage from incision or if you develop fevers  Activity:   · Arm movement and lifting:  Be careful using the arm on the side of your pacemaker   Do not move your arm for the first 24 hours after your procedure  Do not  lift your arm above your shoulder or lift more than 10 pounds for one month after your procedure  Avoid pushing, pulling, or repetitive arm movements for one month  This helps the leads stay in place and helps your wound heal  Ask your caregiver when you can drive after your procedure  You may move your arm side to side without lifting above your shoulder, and do not need to wear a sling at home  · Driving: you are ok to drive 48 hours after pacemaker is implanted   · Sports:  Ask your caregiver when it is okay to play tennis, golf, basketball, or any sport that requires you to lift your arms  Do not play full contact sports, such as football, that could damage your pacemaker  Ask your cardiologist or primary healthcare provider how much and what kinds of physical activity are safe for you  Living with a pacemaker:   · Tell all caregivers you have a pacemaker: This includes surgeons, radiologists, and medical technicians  You may want to wear a medical alert ID bracelet or necklace that states that you have a pacemaker  · Carry your pacemaker ID card: Make sure you receive a pacemaker ID card  Carry it with you at all times  It lists important information about your pacemaker  Show it to airport security if you travel  · Avoid electrical interference:  Avoid welding equipment and other equipment with large magnets or electric fields  These things could interfere with how your pacemaker works  Use your cell phone on the ear opposite from your pacemaker  Do not carry your cell phone in your shirt pocket over your chest      · Some Pacemakers are MRI safe  Ask you doctor if it is safe to proceed with MRI and let the radiologist and staff know you have a pacemaker  · Do not touch the skin around your pacemaker: This can cause damage to the lead wires or move the pacemaker unit from where it should be      Contact your cardiologist or primary healthcare provider if:   · The area around your pacemaker has increasing amount of pain after surgery  The pain should improve over first few days after implantation  · The skin around your stitches has increasing redness, swelling, or has drainage  This may mean that you have an infection  · You have a fever  · You have chills, a cough, and feel weak or achy  These are also signs of infection  · Your feet or ankles are more swollen than your baseline  · Your Heart rate is less than 50 beats per minute     Seek care immediately if:   · Your bandage becomes soaked with blood  · Your pacemaker is swelling rapidly    · Your stitches open up  · You feel your heart suddenly beating very slowly or quickly  · You become too weak or dizzy to stand, or you pass out  · Your arm or leg feels warm, tender, and painful  It may look swollen and red  · You have chest pain that does not go away with rest or medicine  · You feel lightheaded, short of breath, and have chest pain  · You cough up blood  © 2014 [address] is for End User's use only and may not be sold, redistributed or otherwise used for commercial purposes  All illustrations and images included in CareNotes® are the copyrighted property of [employer]  or Edgar Diallo  The above information is an  only  It is not intended as medical advice for individual conditions or treatments  Talk to your doctor, nurse or pharmacist before following any medical regimen to see if it is safe and effective for you

## 2022-03-18 NOTE — ANESTHESIA PREPROCEDURE EVALUATION
Procedure:  Cardiac icd implant/ DUAL CHAMBER PPM (N/A Chest)    Syncope with bradycardia    Relevant Problems   PULMONARY   (+) INDRA (obstructive sleep apnea)        Physical Exam    Airway    Mallampati score: II  TM Distance: >3 FB  Neck ROM: full     Dental   No notable dental hx     Cardiovascular  Rhythm: regular, Rate: normal, Cardiovascular exam normal    Pulmonary  Pulmonary exam normal Breath sounds clear to auscultation,     Other Findings        Anesthesia Plan  ASA Score- 3     Anesthesia Type- IV sedation with anesthesia with ASA Monitors  Additional Monitors:   Airway Plan:     Comment: Discussed risks/benefits, including medication reactions, awareness, aspiration, and serious/life threatening complications  Plan to maintain native airway with IVGA, monitored with EtCO2  Plan Factors-Exercise tolerance (METS): >4 METS  Patient summary reviewed  Patient instructed to abstain from smoking on day of procedure  Patient did not smoke on day of surgery  Induction- intravenous  Postoperative Plan-     Informed Consent- Anesthetic plan and risks discussed with patient  I personally reviewed this patient with the CRNA  Discussed and agreed on the Anesthesia Plan with the CRNA  Jean Evans

## 2022-04-01 ENCOUNTER — IN-CLINIC DEVICE VISIT (OUTPATIENT)
Dept: CARDIOLOGY CLINIC | Facility: CLINIC | Age: 42
End: 2022-04-01

## 2022-04-01 DIAGNOSIS — Z95.0 CARDIAC PACEMAKER IN SITU: Primary | ICD-10-CM

## 2022-04-01 PROCEDURE — 99024 POSTOP FOLLOW-UP VISIT: CPT | Performed by: INTERNAL MEDICINE

## 2022-04-01 NOTE — PROGRESS NOTES
Results for orders placed or performed in visit on 04/01/22   Cardiac EP device report    Narrative    MDT DUAL PM/ ACTIVE SYSTEM IS MRI CONDITIONAL  WOUND CHECK: INCISION CLEAN AND DRY WITH EDGES APPROXIMATED; WOUND CARE AND RESTRICTIONS REVIEWED WITH PATIENT  DEVICE INTERROGATED IN THE [place] OFFICE: BATTERY VOLTAGE ADEQUATE-11 YRS  AP 65%  26%  ALL AVAILABLE LEAD PARAMETERS WITHIN NORMAL LIMITS  2 DEVICE CLASSIFIED FAST A/V NOTED; PT STATES HE WAS ON WALK THAT DAY  SIMILAR MORPHOLOGY TO PRESENTING  EF 64% (2021)  NO PROGRAMMING CHANGES MADE TO DEVICE PARAMETERS  NORMAL DEVICE FUNCTION   NC         Current Outpatient Medications:     acetaminophen (TYLENOL) 500 mg tablet, Take 1,000 mg by mouth every 6 (six) hours as needed for mild pain (Patient not taking: Reported on 3/1/2022 ), Disp: , Rfl:     cholecalciferol (VITAMIN D3) 1,000 units tablet, Take 2,000 Units by mouth daily  , Disp: , Rfl:     gabapentin (NEURONTIN) 300 mg capsule, Take 300 mg by mouth 3 (three) times a day, Disp: , Rfl:     Hyoscyamine Sulfate SL 0 125 MG SUBL, Place under the tongue as needed, Disp: , Rfl:     ibuprofen (MOTRIN) 400 mg tablet, Take by mouth every 6 (six) hours as needed for mild pain (Patient not taking: Reported on 3/1/2022 ), Disp: , Rfl:     ketoconazole (NIZORAL) 2 % shampoo, , Disp: , Rfl:     LORazepam (ATIVAN) 0 5 mg tablet, Take by mouth every 6 (six) hours as needed for anxiety, Disp: , Rfl:     Methylphenidate HCl ER 36 MG TB24, Take 54 mg by mouth in the morning  , Disp: , Rfl:     Multiple Vitamin (Multi Vitamin Daily) TABS, Take by mouth daily, Disp: , Rfl:     ondansetron (ZOFRAN) 4 mg tablet, Take 4 mg by mouth every 8 (eight) hours as needed for nausea or vomiting (Patient not taking: Reported on 3/1/2022 ), Disp: , Rfl:     oxiconazole (Oxistat) 1 % CREA, , Disp: , Rfl:     pantoprazole (PROTONIX) 40 mg tablet, Take 20 mg by mouth 2 (two) times a day  , Disp: , Rfl:     traMADol (ULTRAM) 50 mg tablet, Take 50 mg by mouth every 6 (six) hours as needed for moderate pain (Pt takes for severe pain ), Disp: , Rfl:     vilazodone (VIIBRYD) 20 mg tablet, Take 30 mg by mouth daily , Disp: , Rfl:

## 2022-04-11 ENCOUNTER — TELEPHONE (OUTPATIENT)
Dept: CARDIOLOGY CLINIC | Facility: CLINIC | Age: 42
End: 2022-04-11

## 2022-04-11 NOTE — TELEPHONE ENCOUNTER
Received msg via email from pt  Just confirming I read folks typically can be released fully around 4 weeks to duty and I just curious I have not taken any pain medication since day two from surgery my site looks amazing and I feel very well and hoping I can be back to work for April 18, 2022 so please let me know if we can make this happen? 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

## 2022-04-15 ENCOUNTER — DOCUMENTATION (OUTPATIENT)
Dept: CARDIOLOGY CLINIC | Facility: CLINIC | Age: 42
End: 2022-04-15

## 2022-04-15 NOTE — PROGRESS NOTES
FMLA forms received via email  Updated, completed and signed   Emailed back to the pt, per his request

## 2022-05-31 ENCOUNTER — HOSPITAL ENCOUNTER (EMERGENCY)
Facility: HOSPITAL | Age: 42
Discharge: HOME | End: 2022-05-31
Attending: EMERGENCY MEDICINE | Admitting: EMERGENCY MEDICINE
Payer: COMMERCIAL

## 2022-05-31 ENCOUNTER — APPOINTMENT (EMERGENCY)
Dept: RADIOLOGY | Facility: HOSPITAL | Age: 42
End: 2022-05-31
Payer: COMMERCIAL

## 2022-05-31 VITALS
WEIGHT: 240.08 LBS | OXYGEN SATURATION: 97 % | SYSTOLIC BLOOD PRESSURE: 111 MMHG | RESPIRATION RATE: 18 BRPM | DIASTOLIC BLOOD PRESSURE: 60 MMHG | TEMPERATURE: 97.5 F | HEART RATE: 69 BPM | BODY MASS INDEX: 32.56 KG/M2

## 2022-05-31 DIAGNOSIS — R07.9 CHEST PAIN, UNSPECIFIED TYPE: Primary | ICD-10-CM

## 2022-05-31 LAB
2HR DELTA HS TROPONIN: >1 NG/L
ALBUMIN SERPL BCP-MCNC: 3.7 G/DL (ref 3.5–5)
ALP SERPL-CCNC: 103 U/L (ref 46–116)
ALT SERPL W P-5'-P-CCNC: 26 U/L (ref 12–78)
ANION GAP SERPL CALCULATED.3IONS-SCNC: 7 MMOL/L (ref 4–13)
AST SERPL W P-5'-P-CCNC: 13 U/L (ref 5–45)
BASOPHILS # BLD AUTO: 0.03 THOUSANDS/ΜL (ref 0–0.1)
BASOPHILS NFR BLD AUTO: 0 % (ref 0–1)
BILIRUB SERPL-MCNC: 0.36 MG/DL (ref 0.2–1)
BUN SERPL-MCNC: 10 MG/DL (ref 5–25)
CALCIUM SERPL-MCNC: 8.3 MG/DL (ref 8.3–10.1)
CARDIAC TROPONIN I PNL SERPL HS: 3 NG/L
CARDIAC TROPONIN I PNL SERPL HS: <2 NG/L
CHLORIDE SERPL-SCNC: 108 MMOL/L (ref 100–108)
CO2 SERPL-SCNC: 27 MMOL/L (ref 21–32)
CREAT SERPL-MCNC: 0.76 MG/DL (ref 0.6–1.3)
EOSINOPHIL # BLD AUTO: 0.1 THOUSAND/ΜL (ref 0–0.61)
EOSINOPHIL NFR BLD AUTO: 1 % (ref 0–6)
ERYTHROCYTE [DISTWIDTH] IN BLOOD BY AUTOMATED COUNT: 13.2 % (ref 11.6–15.1)
GFR SERPL CREATININE-BSD FRML MDRD: 114 ML/MIN/1.73SQ M
GLUCOSE SERPL-MCNC: 84 MG/DL (ref 65–140)
HCT VFR BLD AUTO: 39.3 % (ref 36.5–49.3)
HGB BLD-MCNC: 13 G/DL (ref 12–17)
IMM GRANULOCYTES # BLD AUTO: 0.04 THOUSAND/UL (ref 0–0.2)
IMM GRANULOCYTES NFR BLD AUTO: 1 % (ref 0–2)
LYMPHOCYTES # BLD AUTO: 1.75 THOUSANDS/ΜL (ref 0.6–4.47)
LYMPHOCYTES NFR BLD AUTO: 20 % (ref 14–44)
MCH RBC QN AUTO: 29.3 PG (ref 26.8–34.3)
MCHC RBC AUTO-ENTMCNC: 33.1 G/DL (ref 31.4–37.4)
MCV RBC AUTO: 89 FL (ref 82–98)
MONOCYTES # BLD AUTO: 0.54 THOUSAND/ΜL (ref 0.17–1.22)
MONOCYTES NFR BLD AUTO: 6 % (ref 4–12)
NEUTROPHILS # BLD AUTO: 6.35 THOUSANDS/ΜL (ref 1.85–7.62)
NEUTS SEG NFR BLD AUTO: 72 % (ref 43–75)
NRBC BLD AUTO-RTO: 0 /100 WBCS
PLATELET # BLD AUTO: 311 THOUSANDS/UL (ref 149–390)
PMV BLD AUTO: 10.3 FL (ref 8.9–12.7)
POTASSIUM SERPL-SCNC: 3.9 MMOL/L (ref 3.5–5.3)
PROT SERPL-MCNC: 6.6 G/DL (ref 6.4–8.2)
RBC # BLD AUTO: 4.43 MILLION/UL (ref 3.88–5.62)
SODIUM SERPL-SCNC: 142 MMOL/L (ref 136–145)
WBC # BLD AUTO: 8.81 THOUSAND/UL (ref 4.31–10.16)

## 2022-05-31 PROCEDURE — 99285 EMERGENCY DEPT VISIT HI MDM: CPT | Performed by: EMERGENCY MEDICINE

## 2022-05-31 PROCEDURE — 71045 X-RAY EXAM CHEST 1 VIEW: CPT

## 2022-05-31 PROCEDURE — 99285 EMERGENCY DEPT VISIT HI MDM: CPT

## 2022-05-31 PROCEDURE — 84484 ASSAY OF TROPONIN QUANT: CPT | Performed by: EMERGENCY MEDICINE

## 2022-05-31 PROCEDURE — 36415 COLL VENOUS BLD VENIPUNCTURE: CPT | Performed by: EMERGENCY MEDICINE

## 2022-05-31 PROCEDURE — 80053 COMPREHEN METABOLIC PANEL: CPT | Performed by: EMERGENCY MEDICINE

## 2022-05-31 PROCEDURE — 93005 ELECTROCARDIOGRAM TRACING: CPT

## 2022-05-31 PROCEDURE — 85025 COMPLETE CBC W/AUTO DIFF WBC: CPT | Performed by: EMERGENCY MEDICINE

## 2022-05-31 RX ORDER — OMEPRAZOLE 20 MG/1
20 CAPSULE, DELAYED RELEASE ORAL 2 TIMES DAILY
COMMUNITY

## 2022-05-31 NOTE — Clinical Note
Stacia Ta was seen and treated in our emergency department on 5/31/2022  No restrictions            Diagnosis:     Chen Ma  may return to work on return date  He may return on this date: 06/03/2022         If you have any questions or concerns, please don't hesitate to call        Ramona Woodruff MD    ______________________________           _______________          _______________  Hospital Representative                              Date                                Time
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
## 2022-06-01 ENCOUNTER — TELEPHONE (OUTPATIENT)
Dept: CARDIOLOGY CLINIC | Facility: CLINIC | Age: 42
End: 2022-06-01

## 2022-06-01 DIAGNOSIS — R07.2 PRECORDIAL PAIN: Primary | ICD-10-CM

## 2022-06-01 NOTE — TELEPHONE ENCOUNTER
Pt MARS stating that he was in the Er last night has a f/u apt with [facility] the end of June but wants meds prior to his apt for his angina    Please call pt [phone]

## 2022-06-01 NOTE — TELEPHONE ENCOUNTER
Spoke with Dr Valentino Tanner, we reviewed ED records  Would not order medication without further evaluation  She would like for patient to have a stress echo, which I ordered  Please schedule  Thank you!

## 2022-06-02 LAB
ATRIAL RATE: 74 BPM
P AXIS: 60 DEGREES
PR INTERVAL: 172 MS
QRS AXIS: 44 DEGREES
QRSD INTERVAL: 92 MS
QT INTERVAL: 376 MS
QTC INTERVAL: 417 MS
T WAVE AXIS: 42 DEGREES
VENTRICULAR RATE: 74 BPM

## 2022-06-21 ENCOUNTER — HOSPITAL ENCOUNTER (OUTPATIENT)
Dept: NON INVASIVE DIAGNOSTICS | Facility: CLINIC | Age: 42
Discharge: HOME | End: 2022-06-21
Payer: COMMERCIAL

## 2022-06-21 VITALS — BODY MASS INDEX: 29.8 KG/M2 | HEIGHT: 72 IN | WEIGHT: 220 LBS

## 2022-06-21 DIAGNOSIS — R07.2 PRECORDIAL PAIN: ICD-10-CM

## 2022-06-21 LAB
BASELINE ST DEPRESSION: 0 MM
MAX HR PERCENT: 103 %
MAX HR: 187 BPM
RATE PRESSURE PRODUCT: NORMAL
SL CV LV EF: 65
SL CV STRESS RECOVERY BP: NORMAL MMHG
SL CV STRESS RECOVERY HR: 107 BPM
SL CV STRESS RECOVERY O2 SAT: 98 %
SL CV STRESS STAGE REACHED: 4
STRESS BASELINE BP: NORMAL MMHG
STRESS BASELINE HR: 69 BPM
STRESS O2 SAT REST: 98 %
STRESS PEAK HR: 176 BPM
STRESS POST ESTIMATED WORKLOAD: 17.2 METS
STRESS POST EXERCISE DUR MIN: 13 MIN
STRESS POST EXERCISE DUR SEC: 1 SEC
STRESS POST O2 SAT PEAK: 99 %
STRESS POST PEAK BP: 162 MMHG
STRESS ST DEPRESSION: 0 MM

## 2022-06-21 PROCEDURE — 93351 STRESS TTE COMPLETE: CPT | Performed by: INTERNAL MEDICINE

## 2022-06-21 PROCEDURE — 93350 STRESS TTE ONLY: CPT

## 2022-06-22 LAB
CHEST PAIN STATEMENT: NORMAL
MAX DIASTOLIC BP: 70 MMHG
MAX HEART RATE: 187 BPM
MAX PREDICTED HEART RATE: 180 BPM
MAX. SYSTOLIC BP: 182 MMHG
PROTOCOL NAME: NORMAL
REASON FOR TERMINATION: NORMAL
TARGET HR FORMULA: NORMAL
TEST INDICATION: NORMAL
TIME IN EXERCISE PHASE: NORMAL

## 2022-06-23 ENCOUNTER — TELEPHONE (OUTPATIENT)
Dept: CARDIOLOGY CLINIC | Facility: CLINIC | Age: 42
End: 2022-06-23

## 2022-06-23 NOTE — TELEPHONE ENCOUNTER
----- Message from Fabiano Murphy sent at 6/21/2022  4:20 PM EDT -----  Please let patient know that his stress test showed no evidence of ischemia  Thank you!

## 2022-06-28 RX ORDER — METHYLPHENIDATE HYDROCHLORIDE 54 MG/1
TABLET, EXTENDED RELEASE ORAL
COMMUNITY
Start: 2022-06-17

## 2022-06-28 RX ORDER — BUSPIRONE HYDROCHLORIDE 7.5 MG/1
7.5 TABLET ORAL 2 TIMES DAILY
COMMUNITY
Start: 2022-06-02 | End: 2023-06-02

## 2022-06-30 ENCOUNTER — OFFICE VISIT (OUTPATIENT)
Dept: CARDIOLOGY CLINIC | Facility: CLINIC | Age: 42
End: 2022-06-30

## 2022-06-30 VITALS
SYSTOLIC BLOOD PRESSURE: 114 MMHG | HEART RATE: 70 BPM | HEIGHT: 72 IN | OXYGEN SATURATION: 98 % | WEIGHT: 232.6 LBS | BODY MASS INDEX: 31.51 KG/M2 | DIASTOLIC BLOOD PRESSURE: 60 MMHG

## 2022-06-30 DIAGNOSIS — R55 SYNCOPE, UNSPECIFIED SYNCOPE TYPE: Primary | ICD-10-CM

## 2022-06-30 DIAGNOSIS — G47.33 OSA (OBSTRUCTIVE SLEEP APNEA): ICD-10-CM

## 2022-06-30 DIAGNOSIS — R00.1 BRADYCARDIA: ICD-10-CM

## 2022-06-30 DIAGNOSIS — Z95.0 PACEMAKER: ICD-10-CM

## 2022-06-30 PROCEDURE — 99214 OFFICE O/P EST MOD 30 MIN: CPT | Performed by: NURSE PRACTITIONER

## 2022-06-30 NOTE — ASSESSMENT & PLAN NOTE
History of INDRA as well as episodes of central sleep apnea noted  He uses CPAP faithfully and follows with sleep medicine

## 2022-06-30 NOTE — PROGRESS NOTES
Cardiology Follow Up    Francis Shah  1980  [id number]  [facility] [address]  [address] FLO[address]  [facility] [address] [phone]    Celina Junior presents for follow up of syncope with bradycardia  1  Syncope, unspecified syncope type  Assessment & Plan:  Pt has a history of syncope in his teenage years along with a history of bradycardia/junctional rhythm  He was evaluated by cardiology in [place] at that time and diagnosed with vasovagal syncope  He was prescribed midodrine at that time  He does report episodes of near-syncope in the recent past, evaluated by his PCP with ECG in office showing bradycardia in 40's  Recurrent witnessed syncopal events - at home after surgery and then 2 witnessed events in the ER  Telemetry review indicates heart rate in the 40's the evening of hospital admission  Strips reviewed with EP, who felt this was consistent with vasovagal bradycardia  Echo 11/9/2021 with EF 65%  14 day AT O 12/1-12/15/21 reveals HR  with avg 62 bpm  Heart rate does go down into the 30's while awake  No high grade block or pauses identified  Met with Mindy Mathur and subsequently underwent dual chamber PPM insertion 3/18/22 with complete resolution of symptoms  2  Bradycardia  Assessment & Plan:  See discussion under syncope  3  Pacemaker  Assessment & Plan:  Medtronic dual chamber PPM insertion at B 3/18/22 for indication of symptomatic bradycardia with syncope  4  INDRA (obstructive sleep apnea)  Assessment & Plan:  History of INDRA as well as episodes of central sleep apnea noted  He uses CPAP faithfully and follows with sleep medicine  5  BMI 31 0-31 9,adult  Assessment & Plan: Body mass index is 31 55 kg/m²  Patient has lost 30 pounds since December 2021 with dietary changes  I applauded his efforts           HPI  Celina Junior has a past medical history of syncope, bradycardia/junctional rhythm, INDRA, obesity, gastritis, pancreatitis, questionable celiac disease with anemia  Previously seen by cardiology in [facility] in teen years for syncope and [facility] in 2019 for episode of chest pain        He underwent a work up by cardiology in [facility] in his teen years for recurrent syncopal events  He reports undergoing stress tests, Tilt table tests  He was diagnosed with vasovagal syncope and started on Midrodrine  He was instructed to increase water and salt intake  He was eventually lost to follow up  In 2019 he was seen at [facility] for chest and abdominal pain at which time a cardiac work up was unrevealing other than sinus bradycardia in the 50's noted on ECG  Stress echo was negative for ischemia changes at that time      He reports that his primary care provider has performed repeated ECG's at office visits due to complaints of near-syncope revealing HR in the 40's and junctional rhythm      He presented to [facility] ER on 11/8/2021 following an episode of syncope at home  He underwent ventral hernia repair on 11/7 morning  His wife does assist with providing the history  She states that he was sleeping most of the day and around 5pm she encouraged him to get up, eat, and go to the bathroom  While walking back from the bathroom he leaned against the recliner and stated he did not feel well  His wife supported him as he slumped over, she laid him on the floor  She states he was unresponsive for several minutes  His BP was 70/30 when she checked it  She did not check his HR  She gave him some icing on his lip and he did arouse  Upon EMS arrival BP was 100/60 after wife had him in trendelenburg position and HR was in the 40's   Blood sugar was 151 upon EMS arrival   He did have another syncopal episode after arriving to the ER, wife believes around 7pm, and then a near-syncopal episode around 10pm   Telemetry during admission did reveal sinus bradycardia during sleep with no recurrent syncopal events  His telemetry strips were reviewed with our EP, who confirmed consistent with vasovagal bradycardia  He did have an episode of central sleep apnea that occurred during his admission where he was alerted by his CPAP company  He is in communication with his sleep medicine provider about this  Echo 11/9 with EF 65% and no significant abnormalities  He was discharged with plan for 14 day live Jagdeep Martinez followed up with me on 2/15/2022 where we reviewed his ZIO results  ZIO revealed avg HR 62 bpm  His slowest HR was 34bpm, which was during sleeping hours, however, his HR was noted to be in the 40-50's on triggered events  He is able to get his heart rate into the 170's with exercise  No high grade blocks or pauses were evident  No syncopal events occurred during the recording period  He has modified his eating and lost 30 pounds since December  He also started Concerta through his psychiatrist  He was referred to [facility] for additional evaluation  Ancelmo met with Dr Stella Levi on 3/1/2022 at which time he opted to pursue PPM for treatment of symptomatic bradycardia with syncope  He underwent dual-chamber Medtronic PPM insertion with left posterior fascicle lead by Dr Stella Levi on 3/18/2022 at 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  He developed mid and left chest discomfort that was non-radiating associated with nausea, shortness of breath, and diaphoresis  He took 325 mg of aspirin and 3 sublingual nitro which resolved his symptoms  He was chest pain free when he arrived to the ER  Pain resolved within 15 minutes  EKG showed a paced rhythm  HS troponin was negative x 2   Additional lab work and chest xray were unrevealing  He was ultimately discharged home  He reached out to our office regarding the ER visit and a stress echo was ordered  He underwent an exercise stress echocardiogram on 6/21/2022 which was negative for ischemia with good activity tolerance, achieving 17 2 METs  6/30/2022: Jose Antonio Aguila presents today for follow up  He denies any recurrent chest pain since that episode in May  He remains active  He was golfing today  He did notice a "pulling" pain in his left pectoral muscle/axillar region when swinging the golf club  He denies any shortness of breath/dyspnea on exertion  He reports complete resolution of lightheaded/dizzy spells since the pacemaker insertion  No palpitations  No leg swelling  He does report fatigue  He questions if he went back to work too soon, he currently works 2 jobs for a total of 80 hours per week  Medical Problems     Problem List     Syncope    Bradycardia    INDRA (obstructive sleep apnea)    BMI 31 0-31 9,adult        Past Medical History:   Diagnosis Date    Anemia     Anxiety     BMI 37 0-37 9, adult     Bradycardia     CPAP (continuous positive airway pressure) dependence     Pt states he is compliant with use   Depression     Gastritis     GERD (gastroesophageal reflux disease)     History of ileus     Pt states it was caused by antibiotic use   Idiopathic pancreatitis     Irritable bowel syndrome     Sleep apnea     Syncope     in teen years, saw cardiology in [place], given Midrodrine  Diagnosed vasovagal at the time      Thrombosed hemorrhoids     Ventral hernia     Vitamin D deficiency      Social History     Socioeconomic History    Marital status: /Civil Union     Spouse name: Not on file    Number of children: Not on file    Years of education: Not on file    Highest education level: Not on file   Occupational History    Not on file   Tobacco Use    Smoking status: Never Smoker    Smokeless tobacco: Never Used   Vaping Use    Vaping Use: Never used   Substance and Sexual Activity    Alcohol use: Yes     Comment: rarely    Drug use: Not Currently    Sexual activity: Not on file     Comment: defer   Other Topics Concern    Not on file   Social History Narrative    Not on file     Social Determinants of Health     Financial Resource Strain: Not on file   Food Insecurity: Not on file   Transportation Needs: Not on file   Physical Activity: Not on file   Stress: Not on file   Social Connections: Not on file   Intimate Partner Violence: Not on file   Housing Stability: Not on file      History reviewed  No pertinent family history  Past Surgical History:   Procedure Laterality Date    ANKLE FRACTURE SURGERY Left     CARDIAC ELECTROPHYSIOLOGY PROCEDURE N/A 3/18/2022    Procedure: Cardiac icd implant/ DUAL CHAMBER PPM;  Surgeon: Judy Rubio MD;  [facility]: [facility]; Service: Cardiology    CHOLECYSTECTOMY      HERNIA REPAIR      INGUINAL HERNIA REPAIR Bilateral     Pt reports having as a child      OR REPAIR RECURR INCIS HERNIA,REDUC N/A 11/8/2021    Procedure: VENTRAL HERNIA REPAIR;  Surgeon: Rochelle Nicole DO;  Location:  MAIN OR;  Service: General    VENTRAL HERNIA REPAIR N/A 2/10/2020    Procedure: REPAIR HERNIA VENTRAL;  Surgeon: Rochelle Nicole DO;  Location:  MAIN OR;  Service: General       Current Outpatient Medications:     acetaminophen (TYLENOL) 500 mg tablet, Take 500 mg by mouth every 6 (six) hours as needed for mild pain, Disp: , Rfl:     busPIRone (BUSPAR) 7 5 mg tablet, Take 7 5 mg by mouth 2 (two) times a day, Disp: , Rfl:     cholecalciferol (VITAMIN D3) 1,000 units tablet, Take 2,000 Units by mouth daily  , Disp: , Rfl:     gabapentin (NEURONTIN) 300 mg capsule, Take 300 mg by mouth 3 (three) times a day, Disp: , Rfl:     Hyoscyamine Sulfate SL 0 125 MG SUBL, Place under the tongue as needed, Disp: , Rfl:     ketoconazole (NIZORAL) 2 % shampoo, , Disp: , Rfl:     LORazepam (ATIVAN) 0 5 mg tablet, Take by mouth every 6 (six) hours as needed for anxiety, Disp: , Rfl:     Methylphenidate HCl ER 54 MG TB24, , Disp: , Rfl:     Multiple Vitamin (Multi Vitamin Daily) TABS, Take by mouth daily, Disp: , Rfl:     omeprazole (PriLOSEC) 20 mg delayed release capsule, Take 20 mg by mouth 2 (two) times a day, Disp: , Rfl:     vilazodone (VIIBRYD) 20 mg tablet, Take 20 mg by mouth daily, Disp: , Rfl:     ibuprofen (MOTRIN) 400 mg tablet, Take by mouth every 6 (six) hours as needed for mild pain (Patient not taking: Reported on 6/30/2022), Disp: , Rfl:     oxiconazole (OXISTAT) 1 % CREA, , Disp: , Rfl:     traMADol (ULTRAM) 50 mg tablet, Take 50 mg by mouth every 6 (six) hours as needed for moderate pain (Pt takes for severe pain ) (Patient not taking: Reported on 6/30/2022), Disp: , Rfl:   Allergies   Allergen Reactions    Gluten Meal - Food Allergy GI Intolerance     Reported Celiac's disease       Labs:     Chemistry        Component Value Date/Time    K 3 9 05/31/2022 1651     05/31/2022 1651    CO2 27 05/31/2022 1651    BUN 10 05/31/2022 1651    CREATININE 0 76 05/31/2022 1651        Component Value Date/Time    CALCIUM 8 3 05/31/2022 1651    ALKPHOS 103 05/31/2022 1651    AST 13 05/31/2022 1651    ALT 26 05/31/2022 1651        Lipid panel 9/25/2021: C 97  T 79  H 30  L 51  Cardiac imaging:    Echo exercise stress test 6/21/2022:  Jerzy protocol  17 2 METs  No chest pain during stress  Stress ECG was negative for ischemia  EF 65%  LV cavity has normal reduction in size post-stress  The left ventricle systolic function is hyperdynamic post-stress  The post-stress echo showed normal wall motion which was hyperdynamic compared to baseline  ZIO 12/1/-12/15/2021:  Min HR of 34 bpm, max HR of 178 bpm, and avg HR of 62bpm    Predominant underlying rhythm was Sinus Rhythm  Rare PAC's and rare PVC's      Echocardiogram 11/9/2021:  EF 65%   Left atrium mildly dilated  Trace MR  Trace TR      ECG 11/8/2021: Sinus bradycardia  Poor anterior R wave progression is noted  Review of Systems   Constitutional: Negative  HENT: Negative  Cardiovascular: Positive for chest pain (musculoskeletal pain located in the left axillar region, one episode of squeezing pain during exertion which has not recurred)  Negative for dyspnea on exertion, irregular heartbeat, leg swelling, near-syncope, orthopnea, palpitations and syncope  Respiratory: Negative for cough and snoring  Endocrine: Negative  Skin: Negative  Musculoskeletal: Negative  Gastrointestinal: Negative  Genitourinary: Negative  Neurological: Negative  Negative for dizziness and light-headedness  Psychiatric/Behavioral: Negative  Vitals:    06/30/22 1435   BP: 114/60   Pulse: 70   SpO2: 98%     Vitals:    06/30/22 1435   Weight: 106 kg (232 lb 9 6 oz)     Height: 6' (182 9 cm)   Body mass index is 31 55 kg/m²  Physical Exam  Vitals and nursing note reviewed  Constitutional:       General: He is not in acute distress  Appearance: He is well-developed  He is not diaphoretic  HENT:      Head: Normocephalic and atraumatic  Neck:      Vascular: No carotid bruit or JVD  Cardiovascular:      Rate and Rhythm: Normal rate and regular rhythm  No extrasystoles are present  Pulses: Intact distal pulses  Heart sounds: Normal heart sounds, S1 normal and S2 normal  No murmur heard  No friction rub  No gallop  Comments: No lower extremity edema  Pulmonary:      Effort: Pulmonary effort is normal  No respiratory distress  Breath sounds: Normal breath sounds  Abdominal:      General: There is no distension  Palpations: Abdomen is soft  Tenderness: There is no abdominal tenderness  Skin:     General: Skin is warm and dry  Neurological:      Mental Status: He is alert and oriented to person, place, and time     Psychiatric:         Mood and Affect: Mood and affect normal          Speech: Speech normal          Behavior: Behavior normal  Behavior is cooperative           Cognition and Memory: Cognition normal

## 2022-06-30 NOTE — ASSESSMENT & PLAN NOTE
Pt has a history of syncope in his teenage years along with a history of bradycardia/junctional rhythm  He was evaluated by cardiology in [place] at that time and diagnosed with vasovagal syncope  He was prescribed midodrine at that time  He does report episodes of near-syncope in the recent past, evaluated by his PCP with ECG in office showing bradycardia in 40's  Recurrent witnessed syncopal events - at home after surgery and then 2 witnessed events in the ER  Telemetry review indicates heart rate in the 40's the evening of hospital admission  Strips reviewed with EP, who felt this was consistent with vasovagal bradycardia  Echo 11/9/2021 with EF 65%  14 day AT O 12/1-12/15/21 reveals HR  with avg 62 bpm  Heart rate does go down into the 30's while awake  No high grade block or pauses identified  Met with Josef Boyd and subsequently underwent dual chamber PPM insertion 3/18/22 with complete resolution of symptoms

## 2022-06-30 NOTE — ASSESSMENT & PLAN NOTE
Medtronic dual chamber PPM insertion at [facility] 3/18/22 for indication of symptomatic bradycardia with syncope

## 2022-06-30 NOTE — ASSESSMENT & PLAN NOTE
Body mass index is 31 55 kg/m²  Patient has lost 30 pounds since December 2021 with dietary changes  I applauded his efforts

## 2022-06-30 NOTE — PATIENT INSTRUCTIONS
Stress test shows no evidence of ischemia  Blood pressure is borderline low, would not advise adding additional anti-anginal at this time  Monitor symptoms and report immediately any recurrent symptoms  Can consider additional imaging if symptoms return  Cholesterol was at goal, will monitor lab work this fall

## 2022-07-08 ENCOUNTER — DOCUMENTATION (OUTPATIENT)
Dept: CARDIOLOGY CLINIC | Facility: CLINIC | Age: 42
End: 2022-07-08

## 2022-09-13 ENCOUNTER — CLINICAL SUPPORT (OUTPATIENT)
Dept: NUTRITION | Facility: CLINIC | Age: 42
End: 2022-09-13
Payer: COMMERCIAL

## 2022-09-13 VITALS — BODY MASS INDEX: 33.96 KG/M2 | WEIGHT: 250.4 LBS

## 2022-09-13 DIAGNOSIS — K90.0 CELIAC DISEASE: ICD-10-CM

## 2022-09-13 DIAGNOSIS — E66.09 CLASS 1 OBESITY DUE TO EXCESS CALORIES WITHOUT SERIOUS COMORBIDITY WITH BODY MASS INDEX (BMI) OF 33.0 TO 33.9 IN ADULT: ICD-10-CM

## 2022-09-13 PROCEDURE — 97803 MED NUTRITION INDIV SUBSEQ: CPT | Performed by: DIETITIAN, REGISTERED

## 2022-09-13 NOTE — PROGRESS NOTES
Follow-Up Nutrition Assessment Form    Patient Name: Yoli Meier    YOB: 1980    Sex: Male      Follow Up Date: 9/13/2022  Start Time: 1:07 PM Stop Time: 1:37 PM Total Minutes: 30 min     Data:  Present at session: self   Parent/Patient Concerns: "Unfortunately my weight is heading in the opposite direction "   Medical Dx/Reason for Referral: Obesity, Celiac Disease   Past Medical History:   Diagnosis Date    Anemia     Anxiety     BMI 37 0-37 9, adult     Bradycardia     CPAP (continuous positive airway pressure) dependence     Pt states he is compliant with use   Depression     Gastritis     GERD (gastroesophageal reflux disease)     History of ileus     Pt states it was caused by antibiotic use   Idiopathic pancreatitis     Irritable bowel syndrome     Sleep apnea     Syncope     in teen years, saw cardiology in [place], given Midrodrine  Diagnosed vasovagal at the time      Thrombosed hemorrhoids     Ventral hernia     Vitamin D deficiency        Current Outpatient Medications   Medication Sig Dispense Refill    acetaminophen (TYLENOL) 500 mg tablet Take 500 mg by mouth every 6 (six) hours as needed for mild pain      busPIRone (BUSPAR) 7 5 mg tablet Take 7 5 mg by mouth 2 (two) times a day      cholecalciferol (VITAMIN D3) 1,000 units tablet Take 2,000 Units by mouth daily        gabapentin (NEURONTIN) 300 mg capsule Take 300 mg by mouth 3 (three) times a day      Hyoscyamine Sulfate SL 0 125 MG SUBL Place under the tongue as needed      ibuprofen (MOTRIN) 400 mg tablet Take by mouth every 6 (six) hours as needed for mild pain (Patient not taking: Reported on 6/30/2022)      ketoconazole (NIZORAL) 2 % shampoo       LORazepam (ATIVAN) 0 5 mg tablet Take by mouth every 6 (six) hours as needed for anxiety      Methylphenidate HCl ER 54 MG TB24       Multiple Vitamin (Multi Vitamin Daily) TABS Take by mouth daily      omeprazole (PriLOSEC) 20 mg delayed release capsule Take 20 mg by mouth 2 (two) times a day      oxiconazole (OXISTAT) 1 % CREA  (Patient not taking: Reported on 6/30/2022)      traMADol (ULTRAM) 50 mg tablet Take 50 mg by mouth every 6 (six) hours as needed for moderate pain (Pt takes for severe pain ) (Patient not taking: Reported on 6/30/2022)      vilazodone (VIIBRYD) 20 mg tablet Take 20 mg by mouth daily       No current facility-administered medications for this visit  Additional Meds/Supplements: Continuing to take Vitamin D 2000 IU + flintstone's multivtamin   Barriers to Learning: None   Labs:    Height: Ht Readings from Last 3 Encounters:   06/30/22 6' (1 829 m)   06/21/22 6' (1 829 m)   03/18/22 6' (1 829 m)      Weight: Wt Readings from Last 10 Encounters:   06/30/22 106 kg (232 lb 9 6 oz)   06/21/22 99 8 kg (220 lb)   05/31/22 109 kg (240 lb 1 3 oz)   03/18/22 102 kg (223 lb 12 8 oz)   03/15/22 103 kg (226 lb 12 8 oz)   03/01/22 103 kg (228 lb)   02/15/22 106 kg (233 lb 3 2 oz)   12/28/21 117 kg (259 lb)   12/13/21 121 kg (267 lb)   11/18/21 126 kg (277 lb)     Estimated body mass index is 31 55 kg/m² as calculated from the following:    Height as of 6/30/22: 6' (1 829 m)  Weight as of 6/30/22: 106 kg (232 lb 9 6 oz)  Wt  Change Since Last Visit: [x]Yes     []No  Amount: Pt unfortunately gained weight from previous 226lb on 3/15/22      Energy Needs: [address] Equation: 2212 - 2629 kcal (mifflin x 1 5 activity minus 750 for 1 5 weight loss to maintenance per week)  2212 - 2629 ml (1 ml/kcal)  90 - 100 g PRO (1 - 1 2 g/kg IBW)  ~35 g fiber/day     Pain Screen: Are you having pain now? No      Previous Goals or Goals Achieved:  9/13/22: Pt unfortunately has gained weight from previous appointment  Pt has started incorporating higher carb foods into diet like fruits and ice cream  Says was eating fruits frequently throughout the day and having ice cream regularly  Also was drinking sugar sweetened iced tea   Pt previously following very low carb diet, primarily nonstarchy vegetables and protein containing foods  Pt also was consuming gluten containing foods "and I regretted it later" such as bread from cheesesteaks or dough from pizza  3/15/22: Pt has successfully lost 51lb, his goal was to lose 50lb total and has met this goal, now desiring to maintain  Reports if he loses more weight would like to weigh between 185 - 210lb which is appropriate (BMI 25-28 range)  Pt inquiring about appropriate macronutrient amounts to maintain and/or continue with slight weight loss to appropriate BMI     12/28/21: Pt successfully lost 18lb since initial appointment within approximately 1 mo  Pt has decreased gluten consumption greatly, has noticed that his BMs are better controlled and because of this is more willing to strictly follow GF regimen  Does report consuming foods that have been in contact with gluten, says he minimizes this though does not want to completely avoid this so he can still eat some of his favorite foods  Pt reports following regimented routine: Breakast at 4-6 AM is 1 cup (5 servings) egg whites with salsa or GF cereal with skim milk, Lunch packed by wife such as salad with spinach, butternut squash, chicken, Dinner: also prepared by wife of similar variety to lunch  Snacks in between meals at 6-9 AM, 9-12 AM, 4:30-9 PM, likes to have fruit or protein bar or Two Good greek yogurt  Drinks ensure max protein occasionally as snack  Pt has minimal intake of grains in his diet, is not entirely sure of varieties he can have that are gluten free  Would not like to include bread, "I don't really miss it " and doesn't like the taste of GF bread  Reports tolerance of regular cow's milk and has been including skim milk in his diet, avoiding cheese at this time due to concern of causing constipation  Pt has been including regular exercise q 2-3 days though this varies depending upon work schedule       New Goals: 1  Pt to lose 1-2lb per week upon follow up  2  Pt to start jogging again 1-2 miles per day q 2-3 days  3  Pt to consume approximately 245 g carbs per day, not to avoid carbs  Initial PES:   Food and nutrition related knowledge deficit  related to newly dx Celiac's disease as evidenced by consumption of gluten in current diet/pt report    New PES: No Change      New Problem List:  1  N/A   2    3          Medical Nutrition Therapy Intervention:  [x]Individualized Meal Plan--Discussed how pt's previous intake was too restrictive in carbohydrate intake  Unforunately weight has rebounded  To prevent yo-yoing of weight, advised modest carb intake of 45-55% of kcal or 245 - 300 grams carbs per day = 75 grams carbs per meal with 15-20 grams per snack  Pt agreeable  Emphasized gluten free carbohydrate options  []Understanding Lab Values   []Basic Pathophysiology of Disease []Food/Medication Interactions   []Food Diary []Exercise   []Lifestyle/Behavior Modification Techniques []Medication, Mechanism of Action   []Label Reading []Self Blood Glucose Monitoring   [x]Weight/BMI Goals--appropriate weight loss of 1-2lb per week recommended, weigh self weekly  Goal of 7-8lb weight loss by follow up in 2 mo  []Other -   Sample 2200 kcal meal plan provided along with written instruction of appropriate carb intake     Other Notes:        Comprehension: []Excellent  [x]Very Good  []Good  []Fair   []Poor    Receptivity: []Excellent  [x]Very Good  []Good  []Fair   []Poor    Expected Compliance: []Excellent  [x]Very Good  []Good  []Fair   []Poor      Labs:  CMP  Lab Results   Component Value Date    K 3 9 05/31/2022     05/31/2022    CO2 27 05/31/2022    BUN 10 05/31/2022    CREATININE 0 76 05/31/2022    GLUF 96 03/18/2022    CALCIUM 8 3 05/31/2022    AST 13 05/31/2022    ALT 26 05/31/2022    ALKPHOS 103 05/31/2022    EGFR 114 05/31/2022       BMP  Lab Results   Component Value Date    CALCIUM 8 3 05/31/2022    K 3 9 05/31/2022    CO2 27 05/31/2022     05/31/2022    BUN 10 05/31/2022    CREATININE 0 76 05/31/2022       Lipids  No results found for: CHOL  No results found for: HDL  No results found for: LDLCALC  No results found for: TRIG  No results found for: CHOLHDL    Hemoglobin A1C  No results found for: HGBA1C    Fasting Glucose  Lab Results   Component Value Date    GLUF 96 03/18/2022       Insulin     Thyroid  No results found for: TSH, H0BNWOW, C2UMTNB, THYROIDAB    Hepatic Function Panel  Lab Results   Component Value Date    ALT 26 05/31/2022    AST 13 05/31/2022    ALKPHOS 103 05/31/2022       Celiac Disease Antibody Panel  No results found for: ENDOMYSIAL IGA, GLIADIN IGA, GLIADIN IGG, IGA, TISSUE TRANSGLUT AB, TTG IGA   Iron  No results found for: IRON, TIBC, FERRITIN    Vitamins  11/23/21 vitamin B12 342, folate 14 7     Alpha SUSHANT Hugo, LDN  [address]  [address] [place] [id number]

## 2022-10-19 ENCOUNTER — REMOTE DEVICE CLINIC VISIT (OUTPATIENT)
Dept: CARDIOLOGY CLINIC | Facility: CLINIC | Age: 42
End: 2022-10-19
Payer: COMMERCIAL

## 2022-10-19 DIAGNOSIS — Z95.0 PRESENCE OF PERMANENT CARDIAC PACEMAKER: Primary | ICD-10-CM

## 2022-10-19 PROCEDURE — 93294 REM INTERROG EVL PM/LDLS PM: CPT | Performed by: INTERNAL MEDICINE

## 2022-10-19 PROCEDURE — 93296 REM INTERROG EVL PM/IDS: CPT | Performed by: INTERNAL MEDICINE

## 2022-10-19 NOTE — PROGRESS NOTES
MDT DUAL PM/ ACTIVE SYSTEM IS MRI CONDITIONAL   CARELINK TRANSMISSION:  BATTERY VOLTAGE ADEQUATE (10 3 YR )   AP 52 2%  28 1%   ALL LEAD PARAMETERS WITHIN NORMAL LIMITS   RV THRESHOLD HAS INCREASED AS OF 10/2022 FROM 0 5V TO 1 875V, CURRENTLY ADAPTED TO 4V/ 4MS  WITH NO CHANGE IN RV IMPEDANCE OR R WAVE VALUES   2 VT-NS EPISODES WITH 1 EGM SHOWING NSVT (#273-6 @ 167 BPM), AND 1 EGM SHOWING PAT (#327-12 @ 158 BPM)   353 SVT-ST EPISODES WITH -167 BPM   >9,999 RATE DROP EPISODES, 179 7/DAY   EF 65% (ECHO 06/2022)   TASK TO DR PARNELL FOR REVIEW   NORMAL DEVICE FUNCTION   RG

## 2022-11-14 RX ORDER — TACROLIMUS 1 MG/G
OINTMENT TOPICAL
COMMUNITY
Start: 2022-09-09

## 2022-11-14 RX ORDER — DOXYCYCLINE HYCLATE 100 MG/1
CAPSULE ORAL
COMMUNITY
Start: 2022-09-18

## 2022-11-16 ENCOUNTER — OFFICE VISIT (OUTPATIENT)
Dept: CARDIOLOGY CLINIC | Facility: CLINIC | Age: 42
End: 2022-11-16

## 2022-11-16 VITALS — OXYGEN SATURATION: 96 % | DIASTOLIC BLOOD PRESSURE: 86 MMHG | SYSTOLIC BLOOD PRESSURE: 128 MMHG

## 2022-11-16 DIAGNOSIS — R00.2 PALPITATIONS: ICD-10-CM

## 2022-11-16 DIAGNOSIS — Z95.0 PACEMAKER: ICD-10-CM

## 2022-11-16 DIAGNOSIS — R55 SYNCOPE, UNSPECIFIED SYNCOPE TYPE: Primary | ICD-10-CM

## 2022-11-16 DIAGNOSIS — R00.1 BRADYCARDIA: ICD-10-CM

## 2022-11-16 DIAGNOSIS — G47.33 OSA (OBSTRUCTIVE SLEEP APNEA): ICD-10-CM

## 2022-11-16 NOTE — PATIENT INSTRUCTIONS
Your pacemaker did show fast rates, but this correlated with stressful situations  I recommend getting back to exercise, eating right, and hydrating properly  Please let me know how your CPAP is adjusted as this could be contributing as well  If you continue with heart pounding episodes we could consider propranolol, however, I am strongly suspicious that these symptoms are related to anxiety  Notify me of any changes

## 2022-11-17 ENCOUNTER — CLINICAL SUPPORT (OUTPATIENT)
Dept: NUTRITION | Facility: CLINIC | Age: 42
End: 2022-11-17

## 2022-11-17 ENCOUNTER — APPOINTMENT (OUTPATIENT)
Dept: LAB | Facility: HOSPITAL | Age: 42
End: 2022-11-17

## 2022-11-17 VITALS — BODY MASS INDEX: 35.18 KG/M2 | WEIGHT: 259.4 LBS

## 2022-11-17 DIAGNOSIS — K76.0 FATTY LIVER: ICD-10-CM

## 2022-11-17 DIAGNOSIS — K90.0 CELIAC DISEASE: ICD-10-CM

## 2022-11-17 DIAGNOSIS — R63.5 WEIGHT GAIN: ICD-10-CM

## 2022-11-17 LAB
ALBUMIN SERPL BCP-MCNC: 4 G/DL (ref 3.5–5)
ALP SERPL-CCNC: 93 U/L (ref 46–116)
ALT SERPL W P-5'-P-CCNC: 33 U/L (ref 12–78)
ANION GAP SERPL CALCULATED.3IONS-SCNC: 2 MMOL/L (ref 4–13)
AST SERPL W P-5'-P-CCNC: 18 U/L (ref 5–45)
BILIRUB SERPL-MCNC: 0.77 MG/DL (ref 0.2–1)
BUN SERPL-MCNC: 21 MG/DL (ref 5–25)
CALCIUM SERPL-MCNC: 9 MG/DL (ref 8.3–10.1)
CHLORIDE SERPL-SCNC: 110 MMOL/L (ref 96–108)
CHOLEST SERPL-MCNC: 119 MG/DL
CO2 SERPL-SCNC: 27 MMOL/L (ref 21–32)
CREAT SERPL-MCNC: 0.92 MG/DL (ref 0.6–1.3)
ERYTHROCYTE [DISTWIDTH] IN BLOOD BY AUTOMATED COUNT: 12.7 % (ref 11.6–15.1)
GFR SERPL CREATININE-BSD FRML MDRD: 102 ML/MIN/1.73SQ M
GLUCOSE P FAST SERPL-MCNC: 94 MG/DL (ref 65–99)
HCT VFR BLD AUTO: 42.5 % (ref 36.5–49.3)
HDLC SERPL-MCNC: 43 MG/DL
HGB BLD-MCNC: 14.2 G/DL (ref 12–17)
LDLC SERPL CALC-MCNC: 60 MG/DL (ref 0–100)
MAGNESIUM SERPL-MCNC: 2.4 MG/DL (ref 1.6–2.6)
MCH RBC QN AUTO: 28.7 PG (ref 26.8–34.3)
MCHC RBC AUTO-ENTMCNC: 33.4 G/DL (ref 31.4–37.4)
MCV RBC AUTO: 86 FL (ref 82–98)
NONHDLC SERPL-MCNC: 76 MG/DL
PLATELET # BLD AUTO: 335 THOUSANDS/UL (ref 149–390)
PMV BLD AUTO: 9.5 FL (ref 8.9–12.7)
POTASSIUM SERPL-SCNC: 4 MMOL/L (ref 3.5–5.3)
PROT SERPL-MCNC: 7.1 G/DL (ref 6.4–8.4)
RBC # BLD AUTO: 4.94 MILLION/UL (ref 3.88–5.62)
SODIUM SERPL-SCNC: 139 MMOL/L (ref 135–147)
TRIGL SERPL-MCNC: 81 MG/DL
WBC # BLD AUTO: 8.65 THOUSAND/UL (ref 4.31–10.16)

## 2022-11-17 NOTE — PROGRESS NOTES
Follow-Up Nutrition Assessment Form    Patient Name: Jonathan Mesa    YOB: 1980    Sex: Male      Follow Up Date: 11/17/2022  Start Time: 11:31 AM Stop Time: 12:01 PM Total Minutes: 30 min     Data:  Present at session: self   Parent/Patient Concerns: "I gained a lot of my weight back "   Medical Dx/Reason for Referral: Obesity, Celiac Disease   Past Medical History:   Diagnosis Date   • Anemia    • Anxiety    • BMI 37 0-37 9, adult    • Bradycardia    • CPAP (continuous positive airway pressure) dependence     Pt states he is compliant with use  • Depression    • Gastritis    • GERD (gastroesophageal reflux disease)    • History of ileus     Pt states it was caused by antibiotic use  • Idiopathic pancreatitis    • Irritable bowel syndrome    • Sleep apnea    • Syncope     in teen years, saw cardiology in [place], given Midrodrine  Diagnosed vasovagal at the time     • Thrombosed hemorrhoids    • Ventral hernia    • Vitamin D deficiency        Current Outpatient Medications   Medication Sig Dispense Refill   • acetaminophen (TYLENOL) 500 mg tablet Take 500 mg by mouth every 6 (six) hours as needed for mild pain     • busPIRone (BUSPAR) 7 5 mg tablet Take 7 5 mg by mouth daily     • cholecalciferol (VITAMIN D3) 1,000 units tablet Take 2,000 Units by mouth daily       • doxycycline hyclate (VIBRAMYCIN) 100 mg capsule      • gabapentin (NEURONTIN) 300 mg capsule Take 300 mg by mouth 3 (three) times a day     • Hyoscyamine Sulfate SL 0 125 MG SUBL Place under the tongue as needed     • ibuprofen (MOTRIN) 400 mg tablet Take by mouth every 6 (six) hours as needed for mild pain (Patient not taking: Reported on 6/30/2022)     • ketoconazole (NIZORAL) 2 % shampoo      • LORazepam (ATIVAN) 0 5 mg tablet Take by mouth every 6 (six) hours as needed for anxiety     • Methylphenidate HCl ER 54 MG TB24      • Multiple Vitamin (Multi Vitamin Daily) TABS Take by mouth daily     • omeprazole (PriLOSEC) 20 mg delayed release capsule Take 20 mg by mouth 2 (two) times a day     • oxiconazole (OXISTAT) 1 % CREA  (Patient not taking: Reported on 6/30/2022)     • tacrolimus (PROTOPIC) 0 1 % ointment      • traMADol (ULTRAM) 50 mg tablet Take 50 mg by mouth every 6 (six) hours as needed for moderate pain (Pt takes for severe pain ) (Patient not taking: Reported on 6/30/2022)     • vilazodone (VIIBRYD) 20 mg tablet Take 20 mg by mouth daily Takes 20 mg and 10 mg to equal 30 mg daily  No current facility-administered medications for this visit  Additional Meds/Supplements: Continuing to take Vitamin D 2000 IU + flintstone's multivtamin   Barriers to Learning: None   Labs:    Height: Ht Readings from Last 3 Encounters:   11/16/22 (P) 6' (1 829 m)   06/30/22 6' (1 829 m)   06/21/22 6' (1 829 m)      Weight: Wt Readings from Last 10 Encounters:   11/17/22 118 kg (259 lb 6 4 oz)   11/16/22 (P) 116 kg (256 lb 12 8 oz)   09/13/22 114 kg (250 lb 6 4 oz)   06/30/22 106 kg (232 lb 9 6 oz)   06/21/22 99 8 kg (220 lb)   05/31/22 109 kg (240 lb 1 3 oz)   03/18/22 102 kg (223 lb 12 8 oz)   03/15/22 103 kg (226 lb 12 8 oz)   03/01/22 103 kg (228 lb)   02/15/22 106 kg (233 lb 3 2 oz)     Estimated body mass index is 35 18 kg/m² (pended) as calculated from the following:    Height as of 11/16/22: (P) 6' (1 829 m)  Weight as of this encounter: 118 kg (259 lb 6 4 oz)  Wt  Change Since Last Visit: [x]Yes     []No  Amount: Pt unfortunately gained weight from previous 226lb on 3/15/22      Energy Needs: [place] [employer] Equation: 2400 - 2600 kcal (mifflin x 1 4-1 5 activity minus 500 for 1 weight loss per week)  2400 - 2600 ml (1 ml/kcal)  90 - 100 g PRO (1 - 1 2 g/kg IBW)  ~35 g fiber/day  300 gm carbs daily (50% kcal from carb)   Pain Screen: Are you having pain now? No      Previous Goals or Goals Achieved:  11/17/22: Pt with continued weight gain   Was previously following very low carb diet, was instructed by Sherri Elkins to increase consumption of fruits, starchy vegetables, gluten free grains etc though did not appear consistent/adequate with this  Pt was previously very precise and methodical with his intake  Has now gone to the other extreme, is eating a wide variety of sugar sweetened foods including gluten containing foods that has been causing diarrhea for him  He says he is seeking therapy tx for his anxiety and stress eating that he believes is contributing to this  Also admits to life stressors such as relationship issues and stressful situations at workplace  9/13/22: Pt unfortunately has gained weight from previous appointment  Pt has started incorporating higher carb foods into diet like fruits and ice cream  Says was eating fruits frequently throughout the day and having ice cream regularly  Also was drinking sugar sweetened iced tea  Pt previously following very low carb diet, primarily nonstarchy vegetables and protein containing foods  Pt also was consuming gluten containing foods "and I regretted it later" such as bread from cheesesteaks or dough from pizza  3/15/22: Pt has successfully lost 51lb, his goal was to lose 50lb total and has met this goal, now desiring to maintain  Reports if he loses more weight would like to weigh between 185 - 210lb which is appropriate (BMI 25-28 range)  Pt inquiring about appropriate macronutrient amounts to maintain and/or continue with slight weight loss to appropriate BMI     12/28/21: Pt successfully lost 18lb since initial appointment within approximately 1 mo  Pt has decreased gluten consumption greatly, has noticed that his BMs are better controlled and because of this is more willing to strictly follow GF regimen  Does report consuming foods that have been in contact with gluten, says he minimizes this though does not want to completely avoid this so he can still eat some of his favorite foods      Pt reports following regimented routine: Breakast at 4-6 AM is 1 cup (5 servings) egg whites with salsa or GF cereal with skim milk, Lunch packed by wife such as salad with spinach, butternut squash, chicken, Dinner: also prepared by wife of similar variety to lunch  Snacks in between meals at 6-9 AM, 9-12 AM, 4:30-9 PM, likes to have fruit or protein bar or Two Good greek yogurt  Drinks ensure max protein occasionally as snack  Pt has minimal intake of grains in his diet, is not entirely sure of varieties he can have that are gluten free  Would not like to include bread, "I don't really miss it " and doesn't like the taste of GF bread  Reports tolerance of regular cow's milk and has been including skim milk in his diet, avoiding cheese at this time due to concern of causing constipation  Pt has been including regular exercise q 2-3 days though this varies depending upon work schedule  New Goals:      1  Pt to lose 1-2lb per week upon follow up  2  Pt to start jogging again 1-2 miles per day q 2-3 days  3  Pt to consume approximately 245 g carbs per day, not to avoid carbs  Initial PES:   Food and nutrition related knowledge deficit  related to newly dx Celiac's disease as evidenced by consumption of gluten in current diet/pt report    New PES: No Change      New Problem List:  1  N/A   2    3          Medical Nutrition Therapy Intervention:  [x]Individualized Meal Plan--Discussed appropriate kcal and carb intake, heavily emphasized importance of including carbs so that he does not have weight yo-yoing again and diet may be more effective long term  Pt seems agreeable to this  Did review gluten free carb containing foods, encouraged about 300 g per day based on 50% of 2400 kcal  Discussed allowing pt some laverne with meal times/allow for variations in diet when the daily schedule changes, etc  Pt is somewhat excessive in his regimen which puts him in two extremes with his diet very strict or not strict at all  Trying to encourage balance with his intake    []Understanding Lab Values   []Basic Pathophysiology of Disease []Food/Medication Interactions   []Food Diary []Exercise   []Lifestyle/Behavior Modification Techniques []Medication, Mechanism of Action   []Label Reading []Self Blood Glucose Monitoring   [x]Weight/BMI Goals--appropriate weight loss of 1lb per week recommended, weigh self weekly    []Other -      Other Notes:        Comprehension: []Excellent  [x]Very Good  []Good  []Fair   []Poor    Receptivity: []Excellent  [x]Very Good  []Good  []Fair   []Poor    Expected Compliance: []Excellent  [x]Very Good  []Good  []Fair   []Poor      Labs:  CMP  Lab Results   Component Value Date    K 3 9 05/31/2022     05/31/2022    CO2 27 05/31/2022    BUN 10 05/31/2022    CREATININE 0 76 05/31/2022    GLUF 96 03/18/2022    CALCIUM 8 3 05/31/2022    AST 13 05/31/2022    ALT 26 05/31/2022    ALKPHOS 103 05/31/2022    EGFR 114 05/31/2022       BMP  Lab Results   Component Value Date    CALCIUM 8 3 05/31/2022    K 3 9 05/31/2022    CO2 27 05/31/2022     05/31/2022    BUN 10 05/31/2022    CREATININE 0 76 05/31/2022       Lipids  No results found for: CHOL  No results found for: HDL  No results found for: LDLCALC  No results found for: TRIG  No results found for: CHOLHDL    Hemoglobin A1C  No results found for: HGBA1C    Fasting Glucose  Lab Results   Component Value Date    GLUF 96 03/18/2022       Insulin     Thyroid  No results found for: TSH, L6TQTYO, K2HEHNX, THYROIDAB    Hepatic Function Panel  Lab Results   Component Value Date    ALT 26 05/31/2022    AST 13 05/31/2022    ALKPHOS 103 05/31/2022       Celiac Disease Antibody Panel  No results found for: ENDOMYSIAL IGA, GLIADIN IGA, GLIADIN IGG, IGA, TISSUE TRANSGLUT AB, TTG IGA   Iron  No results found for: IRON, TIBC, FERRITIN    Vitamins  11/23/21 vitamin B12 342, folate 14 7     Verta Bloodgood, RD, LDN  [facility]  [facility]  [address] [place] [id number]

## 2022-11-20 PROBLEM — R00.2 PALPITATIONS: Status: ACTIVE | Noted: 2022-11-20

## 2022-11-20 NOTE — ASSESSMENT & PLAN NOTE
Medtronic dual chamber PPM insertion at [facility] 3/18/22 for indication of symptomatic bradycardia with syncope  [facility]  [employer] is managing

## 2022-11-20 NOTE — ASSESSMENT & PLAN NOTE
History of INDRA as well as episodes of central sleep apnea noted  He uses CPAP faithfully and follows with sleep medicine  Recent breakthrough snoring through his CPAP with weight gain  He will be meeting with his sleep medicine provider to review pressure settings

## 2022-11-20 NOTE — ASSESSMENT & PLAN NOTE
Pt has a history of syncope in his teenage years along with a history of bradycardia/junctional rhythm  He was evaluated by cardiology in [place] at that time and diagnosed with vasovagal syncope  He was prescribed midodrine at that time  He does report episodes of near-syncope in the recent past, evaluated by his PCP with ECG in office showing bradycardia in 40's  Recurrent witnessed syncopal events - at home after surgery and then 2 witnessed events in the ER  Telemetry review indicates heart rate in the 40's the evening of hospital admission  Strips reviewed with EP, who felt this was consistent with vasovagal bradycardia  Echo 11/9/2021 with EF 65%  14 day AT O 12/1-12/15/21 reveals HR  with avg 62 bpm  Heart rate does go down into the 30's while awake  No high grade block or pauses identified  Met with Kevin Mena and subsequently underwent dual chamber PPM insertion 3/18/22 with complete resolution of symptoms

## 2022-11-20 NOTE — ASSESSMENT & PLAN NOTE
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

## 2022-11-20 NOTE — ASSESSMENT & PLAN NOTE
Body mass index is 34 83 kg/m² (pended)  Reviewed health risks associated with obesity  Patient has not been careful with diet or exercise and has regained some of the weight he lost   He plans to resume his healthy eating and exercise  Will monitor

## 2022-11-20 NOTE — PROGRESS NOTES
Cardiology Follow Up    Sy Escalera  1980  [id number]  [facility]  [address]  [address]LO[address]  [facility] [id number] [phone]    Jennifer Harvey presents for routine follow up of syncope and bradycardia s/p PPM insertion  1  Syncope, unspecified syncope type  Assessment & Plan:  Pt has a history of syncope in his teenage years along with a history of bradycardia/junctional rhythm  He was evaluated by cardiology in [place] at that time and diagnosed with vasovagal syncope  He was prescribed midodrine at that time  He does report episodes of near-syncope in the recent past, evaluated by his PCP with ECG in office showing bradycardia in 40's  Recurrent witnessed syncopal events - at home after surgery and then 2 witnessed events in the ER  Telemetry review indicates heart rate in the 40's the evening of hospital admission  Strips reviewed with EP, who felt this was consistent with vasovagal bradycardia  Echo 11/9/2021 with EF 65%  14 day AT [facility] 12/1-12/15/21 reveals HR  with avg 62 bpm  Heart rate does go down into the 30's while awake  No high grade block or pauses identified  Met with Thersa Picking Dr Oscar Godwin and subsequently underwent dual chamber PPM insertion 3/18/22 with complete resolution of symptoms  2  Bradycardia  Assessment & Plan:  See discussion under syncope  3  Pacemaker  Assessment & Plan:  Medtronic dual chamber PPM insertion at B 3/18/22 for indication of symptomatic bradycardia with syncope  [facility] is managing  4  Palpitations  Assessment & Plan:  Patient notes episodes of rapid heart rates in associated with anxiety/stress  Recent episodes of SVT vs ST noted on his PPM interrogation which correlated with a 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  We reviewed treatment options  Currently working with his psychiatrist and PCP for anxiety management     Will monitor future interrogations and can consider addition of beta blocker as needed  Magnesium supplementation recommended  Orders:  -     CBC and Platelet  -     Magnesium    5  INDRA (obstructive sleep apnea)  Assessment & Plan:  History of INDRA as well as episodes of central sleep apnea noted  He uses CPAP faithfully and follows with sleep medicine  Recent breakthrough snoring through his CPAP with weight gain  He will be meeting with his sleep medicine provider to review pressure settings  6  BMI 31 0-31 9,adult  Assessment & Plan: Body mass index is 34 83 kg/m² (pended)  Reviewed health risks associated with obesity  Patient has not been careful with diet or exercise and has regained some of the weight he lost   He plans to resume his healthy eating and exercise  Will monitor  HPI  Homar Contreras has a past medical history of syncope, bradycardia/junctional rhythm, INDRA, obesity, gastritis, pancreatitis, questionable celiac disease with anemia  Previously seen by cardiology in [facility] in teen years for syncope and [address] [place] [address] Cardiology in 2019 for episode of chest pain        He underwent a work up by cardiology in [facility] in his teen years for recurrent syncopal events  He reports undergoing stress tests, Tilt table tests  He was diagnosed with vasovagal syncope and started on Midrodrine  He was instructed to increase water and salt intake  He was eventually lost to follow up  In 2019 he was seen at [address] [place] [address] for chest and abdominal pain at which time a cardiac work up was unrevealing other than sinus bradycardia in the 50's noted on ECG  Stress echo was negative for ischemia changes at that time      He reports that his primary care provider has performed repeated ECG's at office visits due to complaints of near-syncope revealing HR in the 40's and junctional rhythm      He presented to [facility] ER on 11/8/2021 following an episode of syncope at home   He underwent ventral hernia repair on 11/7 morning  His wife does assist with providing the history  She states that he was sleeping most of the day and around 5pm she encouraged him to get up, eat, and go to the bathroom  While walking back from the bathroom he leaned against the recliner and stated he did not feel well  His wife supported him as he slumped over, she laid him on the floor  She states he was unresponsive for several minutes  His BP was 70/30 when she checked it  She did not check his HR  She gave him some icing on his lip and he did arouse  Upon EMS arrival BP was 100/60 after wife had him in trendelenburg position and HR was in the 40's  Blood sugar was 151 upon EMS arrival   He did have another syncopal episode after arriving to the ER, wife believes around 7pm, and then a near-syncopal episode around 10pm   Telemetry during admission did reveal sinus bradycardia during sleep with no recurrent syncopal events  His telemetry strips were reviewed with our EP, who confirmed consistent with vasovagal bradycardia  He did have an episode of central sleep apnea that occurred during his admission where he was alerted by his CPAP company  He is in communication with his sleep medicine provider about this  Echo 11/9 with EF 65% and no significant abnormalities  He was discharged with plan for 14 day live Marilyn Smith followed up with me on 2/15/2022 where we reviewed his ZIO results  ZIO revealed avg HR 62 bpm  His slowest HR was 34bpm, which was during sleeping hours, however, his HR was noted to be in the 40-50's on triggered events  He is able to get his heart rate into the 170's with exercise  No high grade blocks or pauses were evident  No syncopal events occurred during the recording period  He has modified his eating and lost 30 pounds since December  He also started Concerta through his psychiatrist  He was referred to [facility] for additional evaluation      Ancelmo met with Dr Meaghan Novak on 3/1/2022 at which time he opted to pursue PPM for treatment of symptomatic bradycardia with syncope  He underwent dual-chamber Medtronic PPM insertion with left posterior fascicle lead by Dr John Parkinson on 3/18/2022 at [facility]  He followed up with the device clinic for an in-person check on 4/1/2022 at which time the device was functioning well      He presented to [address] [place] [address] ER on 5/31/2022 for evaluation of an episode of chest pain  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  He developed mid and left chest discomfort that was non-radiating associated with nausea, shortness of breath, and diaphoresis  He took 325 mg of aspirin and 3 sublingual nitro which resolved his symptoms  He was chest pain free when he arrived to the ER  Pain resolved within 15 minutes  EKG showed a paced rhythm  HS troponin was negative x 2  Additional lab work and chest xray were unrevealing  He was ultimately discharged home  He reached out to our office regarding the ER visit and a stress echo was ordered      He underwent an exercise stress echocardiogram on 6/21/2022 which was negative for ischemia with good activity tolerance, achieving 17 2 METs  11/16/2022: Marciano Penny presents today for routine follow up  He reports recent stress due to marital and job issues  His recent device interrogation showed a couple episodes of SVT vs ST  I reviewed these episodes with Marciano Penny and he confirmed both were with stressful situations, one in particular was on the date 10/15/22 at which time 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  This event was particularly stressful for him  He has been undergoing medication adjustments by his psychiatrist  His Concerta had been increased to 72 mg daily, however he felt this was increasing anxiety and just reduced the dose back to 54 mg this week  He remains on Buspar for anxiety  Also taking gabapentin   He has found himself snoring through his CPAP and wonders if he needs a setting adjustment as he has gained weight  He will be meeting with his sleep specialist in the next few weeks  He reports a pulling pain in his anterior left chest near his shoulder which is aggravated by certain arm movements such as CPR  He does note that his resting heart rate seems higher in the recent weeks  He denies shortness of breath or dyspnea on exertion  No edema or bloating  No lightheadedness, near syncope or syncope  He denies excessive fatigue  Medical Problems     Problem List     Syncope    Bradycardia    Pacemaker    INDRA (obstructive sleep apnea)    BMI 31 0-31 9,adult    Palpitations        Past Medical History:   Diagnosis Date   • Anemia    • Anxiety    • BMI 37 0-37 9, adult    • Bradycardia    • CPAP (continuous positive airway pressure) dependence     Pt states he is compliant with use  • Depression    • Gastritis    • GERD (gastroesophageal reflux disease)    • History of ileus     Pt states it was caused by antibiotic use  • Idiopathic pancreatitis    • Irritable bowel syndrome    • Sleep apnea    • Syncope     in teen years, saw cardiology in [place], given Midrodrine  Diagnosed vasovagal at the time     • Thrombosed hemorrhoids    • Ventral hernia    • Vitamin D deficiency      Social History     Socioeconomic History   • Marital status: /Civil Union     Spouse name: Not on file   • Number of children: Not on file   • Years of education: Not on file   • Highest education level: Not on file   Occupational History   • Not on file   Tobacco Use   • Smoking status: Never   • Smokeless tobacco: Never   Vaping Use   • Vaping Use: Never used   Substance and Sexual Activity   • Alcohol use: Yes     Comment: rarely   • Drug use: Not Currently   • Sexual activity: Not on file     Comment: defer   Other Topics Concern   • Not on file   Social History Narrative   • Not on file     Social Determinants of Health     Financial Resource Strain: Not on file   Food Insecurity: Not on file   Transportation Needs: Not on file   Physical Activity: Not on file   Stress: Not on file   Social Connections: Not on file   Intimate Partner Violence: Not on file   Housing Stability: Not on file      Family History   Problem Relation Age of Onset   • Hypertension Mother    • Hyperlipidemia Mother    • Diabetes Mother    • Hypertension Father      Past Surgical History:   Procedure Laterality Date   • ANKLE FRACTURE SURGERY Left    • CARDIAC ELECTROPHYSIOLOGY PROCEDURE N/A 3/18/2022    Procedure: Cardiac icd implant/ DUAL CHAMBER PPM;  Surgeon: Steven Martin MD;  Location: [facility]; Service: Cardiology   • CHOLECYSTECTOMY     • HERNIA REPAIR     • INGUINAL HERNIA REPAIR Bilateral     Pt reports having as a child     • WA REPAIR RECURR INCIS HERNIA,REDUC N/A 11/8/2021    Procedure: VENTRAL HERNIA REPAIR;  Surgeon: Hamilton Astorga DO;  Location: [facility];  Service: General   • VENTRAL HERNIA REPAIR N/A 2/10/2020    Procedure: REPAIR HERNIA VENTRAL;  Surgeon: Hamilton Astorga DO;  Location: [facility];  Service: General       Current Outpatient Medications:   •  acetaminophen (TYLENOL) 500 mg tablet, Take 500 mg by mouth every 6 (six) hours as needed for mild pain, Disp: , Rfl:   •  busPIRone (BUSPAR) 7 5 mg tablet, Take 7 5 mg by mouth daily, Disp: , Rfl:   •  cholecalciferol (VITAMIN D3) 1,000 units tablet, Take 2,000 Units by mouth daily  , Disp: , Rfl:   •  doxycycline hyclate (VIBRAMYCIN) 100 mg capsule, , Disp: , Rfl:   •  gabapentin (NEURONTIN) 300 mg capsule, Take 300 mg by mouth 3 (three) times a day, Disp: , Rfl:   •  Hyoscyamine Sulfate SL 0 125 MG SUBL, Place under the tongue as needed, Disp: , Rfl:   •  ketoconazole (NIZORAL) 2 % shampoo, , Disp: , Rfl:   •  LORazepam (ATIVAN) 0 5 mg tablet, Take by mouth every 6 (six) hours as needed for anxiety, Disp: , Rfl:   •  Methylphenidate HCl ER 54 MG TB24, , Disp: , Rfl:   • Multiple Vitamin (Multi Vitamin Daily) TABS, Take by mouth daily, Disp: , Rfl:   •  omeprazole (PriLOSEC) 20 mg delayed release capsule, Take 20 mg by mouth 2 (two) times a day, Disp: , Rfl:   •  tacrolimus (PROTOPIC) 0 1 % ointment, , Disp: , Rfl:   •  vilazodone (VIIBRYD) 20 mg tablet, Take 20 mg by mouth daily Takes 20 mg and 10 mg to equal 30 mg daily  , Disp: , Rfl:   •  ibuprofen (MOTRIN) 400 mg tablet, Take by mouth every 6 (six) hours as needed for mild pain (Patient not taking: Reported on 6/30/2022), Disp: , Rfl:   •  oxiconazole (OXISTAT) 1 % CREA, , Disp: , Rfl:   •  traMADol (ULTRAM) 50 mg tablet, Take 50 mg by mouth every 6 (six) hours as needed for moderate pain (Pt takes for severe pain ) (Patient not taking: Reported on 6/30/2022), Disp: , Rfl:   Allergies   Allergen Reactions   • Gluten Meal - Food Allergy GI Intolerance     Reported Celiac's disease       Labs:     Chemistry        Component Value Date/Time    K 4 0 11/17/2022 0713     (H) 11/17/2022 0713    CO2 27 11/17/2022 0713    BUN 21 11/17/2022 0713    CREATININE 0 92 11/17/2022 0713        Component Value Date/Time    CALCIUM 9 0 11/17/2022 0713    ALKPHOS 93 11/17/2022 0713    AST 18 11/17/2022 0713    ALT 33 11/17/2022 0713        Lipid panel 9/25/2021: C 97  T 79  H 30  L 51      Cardiac imaging:     Echo exercise stress test 6/21/2022:  Jerzy protocol  17 2 METs  No chest pain during stress  Stress ECG was negative for ischemia  EF 65%  LV cavity has normal reduction in size post-stress  The left ventricle systolic function is hyperdynamic post-stress  The post-stress echo showed normal wall motion which was hyperdynamic compared to baseline  ECG 5/31/2022: Atrial paced rhythm      ZIO 12/1/-12/15/2021:  Min HR of 34 bpm, max HR of 178 bpm, and avg HR of 62bpm    Predominant underlying rhythm was Sinus Rhythm  Rare PAC's and rare PVC's      Echocardiogram 11/9/2021:  EF 65%  Left atrium mildly dilated  Trace MR   Trace TR      ECG 11/8/2021: Sinus bradycardia  Poor anterior R wave progression is noted  Review of Systems   Constitutional: Positive for weight gain  HENT: Negative  Cardiovascular: Positive for palpitations  Negative for chest pain, dyspnea on exertion, irregular heartbeat, leg swelling, near-syncope and orthopnea  Respiratory: Negative for cough, shortness of breath and snoring  Endocrine: Negative  Skin: Negative  Musculoskeletal: Negative  Gastrointestinal: Negative  Genitourinary: Negative  Neurological: Negative  Negative for light-headedness  Psychiatric/Behavioral: Negative for substance abuse and suicidal ideas  The patient has insomnia and is nervous/anxious  Vitals:    11/16/22 1640   BP: 128/86   Pulse:    Temp:    SpO2:      Vitals:    11/16/22 1542   Weight: (P) 116 kg (256 lb 12 8 oz)     Height: (P) 6' (182 9 cm)   Body mass index is 34 83 kg/m² (pended)  Physical Exam  Vitals and nursing note reviewed  Constitutional:       General: He is not in acute distress  Appearance: He is well-developed and well-nourished  He is obese  He is not diaphoretic  HENT:      Head: Normocephalic and atraumatic  Neck:      Vascular: No carotid bruit or JVD  Cardiovascular:      Rate and Rhythm: Normal rate and regular rhythm  No extrasystoles are present  Pulses: Intact distal pulses  Heart sounds: Normal heart sounds, S1 normal and S2 normal  No murmur heard  No friction rub  No gallop  Comments: No edema  Pulmonary:      Effort: Pulmonary effort is normal  No respiratory distress  Breath sounds: Normal breath sounds  Abdominal:      General: There is no distension  Palpations: Abdomen is soft  Tenderness: There is no abdominal tenderness  Skin:     General: Skin is warm and dry  Findings: No rash  Neurological:      Mental Status: He is alert and oriented to person, place, and time     Psychiatric:         Mood and Affect: Mood and affect, mood and affect normal          Speech: Speech normal          Behavior: Behavior normal  Behavior is cooperative           Cognition and Memory: Cognition and memory normal

## 2023-01-25 ENCOUNTER — REMOTE DEVICE CLINIC VISIT (OUTPATIENT)
Dept: CARDIOLOGY CLINIC | Facility: CLINIC | Age: 43
End: 2023-01-25

## 2023-01-25 DIAGNOSIS — Z95.0 PRESENCE OF PERMANENT CARDIAC PACEMAKER: Primary | ICD-10-CM

## 2023-01-25 NOTE — PROGRESS NOTES
Results for orders placed or performed in visit on 01/25/23   Cardiac EP device report    Narrative    MDT DUAL PM/ ACTIVE SYSTEM IS MRI CONDITIONAL  CARELINK TRANSMISSION: BATTERY VOLTAGE ADEQUATE (10 8 YRS)  AP 35%  22 5% (AAI-DDD 70/RATE DROP ON); ALL RV LEAD W/ELEVATED THRESHOLD - STABLE; ALL OTHER AVAILABLE LEAD PARAMETERS WITHIN NORMAL LIMITS/STABLE; 8 SVT-ST EPISODE W/ST ON EGM'S, MAX DURATION 01:42 MINS WITH AVG  -162 BPM  14,114 RATE DROP EPISODES  (143 3 /DAY)  NORMAL DEVICE FUNCTION     ES

## 2023-03-08 ENCOUNTER — OFFICE VISIT (OUTPATIENT)
Dept: CARDIOLOGY CLINIC | Facility: CLINIC | Age: 43
End: 2023-03-08
Payer: COMMERCIAL

## 2023-03-08 VITALS
DIASTOLIC BLOOD PRESSURE: 80 MMHG | HEART RATE: 88 BPM | HEIGHT: 72 IN | WEIGHT: 274 LBS | SYSTOLIC BLOOD PRESSURE: 122 MMHG | OXYGEN SATURATION: 97 % | BODY MASS INDEX: 37.11 KG/M2

## 2023-03-08 DIAGNOSIS — R00.1 BRADYCARDIA: ICD-10-CM

## 2023-03-08 DIAGNOSIS — E66.09 CLASS 2 OBESITY DUE TO EXCESS CALORIES WITHOUT SERIOUS COMORBIDITY WITH BODY MASS INDEX (BMI) OF 37.0 TO 37.9 IN ADULT: ICD-10-CM

## 2023-03-08 DIAGNOSIS — R55 SYNCOPE, UNSPECIFIED SYNCOPE TYPE: ICD-10-CM

## 2023-03-08 DIAGNOSIS — Z95.0 PACEMAKER: ICD-10-CM

## 2023-03-08 DIAGNOSIS — G47.33 OSA (OBSTRUCTIVE SLEEP APNEA): ICD-10-CM

## 2023-03-08 DIAGNOSIS — R00.2 PALPITATIONS: Primary | ICD-10-CM

## 2023-03-08 PROCEDURE — 99214 OFFICE O/P EST MOD 30 MIN: CPT | Performed by: INTERNAL MEDICINE

## 2023-03-08 RX ORDER — PROPRANOLOL HYDROCHLORIDE 10 MG/1
10 TABLET ORAL 2 TIMES DAILY
Qty: 60 TABLET | Refills: 11 | Status: SHIPPED | OUTPATIENT
Start: 2023-03-08 | End: 2023-06-14 | Stop reason: SDUPTHER

## 2023-03-08 RX ORDER — ONDANSETRON 4 MG/1
TABLET, ORALLY DISINTEGRATING ORAL
COMMUNITY
Start: 2023-02-04

## 2023-03-08 NOTE — PROGRESS NOTES
Cardiology Office Visit    Don Butcher  [id number]  1980    [facility] [address]  [address] [place] [address] [phone]      Dear Vick Ace DO,    I had the pleasure of seeing your patient at our [facility] [place] office today 3/8/2023. As you know he is a pleasant 40y.o. year old male with a medical history as described below. Reason for office visit: Follow up syncope and bradycardia s/p PPM insertion. 1. Palpitations    2. Pacemaker    3. Syncope, unspecified syncope type    4. Bradycardia    5. Class 2 obesity due to excess calories without serious comorbidity with body mass index (BMI) of 37.0 to 37.9 in adult    6. INDRA (obstructive sleep apnea)    Plan:    I did recommend starting propranolol 10 mg twice daily given his symptoms of palpitations and episodes of heart racing. I have asked him to send me a portal message with how he is doing on the medications in a few days. I have also asked him to have endocrinology send me a copy of their next office visit note. Patient was instructed to call me with any questions or concerns. _______________________          HPI   Willo Meckel has a past medical history of syncope, bradycardia/junctional rhythm, INDRA, obesity, gastritis, pancreatitis, questionable celiac disease with anemia. Previously seen by cardiology in [facility] in teen years for syncope and [facility] in 2019 for episode of chest pain. He underwent a work up by cardiology in [facility] in his teen years for recurrent syncopal events. He reports undergoing stress tests, Tilt table tests. He was diagnosed with vasovagal syncope and started on Midrodrine. He was instructed to increase water and salt intake. He was eventually lost to follow up.   In 2019 he was seen at [facility] for chest and abdominal pain at which time a cardiac work up was unrevealing other than sinus bradycardia in the 50's noted on ECG. Stress echo was negative for ischemia changes at that time. He reports that his primary care provider has performed repeated ECG's at office visits due to complaints of near-syncope revealing HR in the 40's and junctional rhythm. He presented to [address] [place] [address] on 11/8/2021 following an episode of syncope at home. He underwent ventral hernia repair on 11/7 morning. Arun Bustillos His BP was 70/30 when she checked it. She did not check his HR. She gave him some icing on his lip and he did arouse. Upon EMS arrival BP was 100/60 after wife had him in trendelenburg position and HR was in the 40's. Malathi Ankush followed up with CR[facility] on 2/15/2022 where they reviewed his ZIO results. ZIO revealed avg HR 62 bpm. His slowest HR was 34bpm, which was during sleeping hours, however, his HR was noted to be in the 40-50's on triggered events. He is able to get his heart rate into the 170's with exercise. No high grade blocks or pauses were evident. No syncopal events occurred during the recording period. He had modified his eating and lost 30 pounds since December. He also started Concerta through his psychiatrist. He was referred to [facility] for additional evaluation. Ancelmo met with Dr. Lorie Lira on 3/1/2022 at which time he opted to pursue PPM for treatment of symptomatic bradycardia with syncope. He underwent dual-chamber Medtronic PPM insertion with left posterior fascicle lead by Dr. Lorie Lira on 3/18/2022 at [facility]. He presented to [address] [place] [address] on 5/31/2022 for evaluation of an episode of chest pain. 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 He developed mid and left chest discomfort that was non-radiating associated with nausea, shortness of breath, and diaphoresis. He underwent an exercise stress echocardiogram on 6/21/2022 which was negative for ischemia with good activity tolerance, achieving 17.2 METs.       Malathi Lechuga was last seen 11/16/2022 by [place] at which time he noted increased stress. Device interrogation had noted a few episodes of SVT.     3/8/2023: Colette Gutierrez presents to the office today for follow up. He tells me that his thyroid numbers are 'off'. He is sleeping 'all the time'. Skin is dry and scaly. He has noticed palpitations as well. He is seeing endocrinology. Heart rates seem to be faster. He describes feeling his heart racing. Heart rates at times  at rest. Device interrogation 1/25/2023 showed 8 episode of SVT. Weight is up today. Patient Active Problem List   Diagnosis    Syncope    INDRA (obstructive sleep apnea)    Bradycardia    BMI 31.0-31.9,adult    Pacemaker    Palpitations     Past Medical History:   Diagnosis Date    Anemia     Anxiety     BMI 37.0-37.9, adult     Bradycardia     CPAP (continuous positive airway pressure) dependence     Pt states he is compliant with use. Depression     Gastritis     GERD (gastroesophageal reflux disease)     History of ileus     Pt states it was caused by antibiotic use. Idiopathic pancreatitis     Irritable bowel syndrome     Sleep apnea     Syncope     in teen years, saw cardiology in [facility], given Midrodrine. Diagnosed vasovagal at the time.     Thrombosed hemorrhoids     Ventral hernia     Vitamin D deficiency      Social History     Socioeconomic History    Marital status: /Civil Union     Spouse name: Not on file    Number of children: Not on file    Years of education: Not on file    Highest education level: Not on file   Occupational History    Not on file   Tobacco Use    Smoking status: Never    Smokeless tobacco: Never   Vaping Use    Vaping Use: Never used   Substance and Sexual Activity    Alcohol use: Yes     Comment: rarely    Drug use: Not Currently    Sexual activity: Not on file     Comment: defer   Other Topics Concern    Not on file   Social History Narrative    Not on file     Social Determinants of Health Financial Resource Strain: Not on file   Food Insecurity: Not on file   Transportation Needs: Not on file   Physical Activity: Not on file   Stress: Not on file   Social Connections: Not on file   Intimate Partner Violence: Not on file   Housing Stability: Not on file      Family History   Problem Relation Age of Onset    Hypertension Mother     Hyperlipidemia Mother     Diabetes Mother     Hypertension Father      Past Surgical History:   Procedure Laterality Date    ANKLE FRACTURE SURGERY Left     CARDIAC ELECTROPHYSIOLOGY PROCEDURE N/A 3/18/2022    Procedure: Cardiac icd implant/ DUAL CHAMBER PPM;  Surgeon: Bianca Barillas MD;  Location: [facility]; Service: Cardiology    CHOLECYSTECTOMY      HERNIA REPAIR      INGUINAL HERNIA REPAIR Bilateral     Pt reports having as a child.     MS RPR RECRT INCAL/VNT HERNIA REDUCIBLE N/A 11/8/2021    Procedure: VENTRAL HERNIA REPAIR;  Surgeon: Farhan Menjivar DO;  Location: [facility];  Service: General    VENTRAL HERNIA REPAIR N/A 2/10/2020    Procedure: REPAIR HERNIA VENTRAL;  Surgeon: Farhan Menjivar DO;  Location: [facility];  Service: General       Current Outpatient Medications:     acetaminophen (TYLENOL) 500 mg tablet, Take 500 mg by mouth every 6 (six) hours as needed for mild pain, Disp: , Rfl:     busPIRone (BUSPAR) 7.5 mg tablet, Take 7.5 mg by mouth daily, Disp: , Rfl:     cholecalciferol (VITAMIN D3) 1,000 units tablet, Take 2,000 Units by mouth daily  , Disp: , Rfl:     doxycycline hyclate (VIBRAMYCIN) 100 mg capsule, , Disp: , Rfl:     gabapentin (NEURONTIN) 300 mg capsule, Take 300 mg by mouth 3 (three) times a day, Disp: , Rfl:     Hyoscyamine Sulfate SL 0.125 MG SUBL, Place under the tongue as needed, Disp: , Rfl:     ketoconazole (NIZORAL) 2 % shampoo, , Disp: , Rfl:     LORazepam (ATIVAN) 0.5 mg tablet, Take by mouth every 6 (six) hours as needed for anxiety, Disp: , Rfl:     Methylphenidate HCl ER 54 MG TB24, , Disp: , Rfl:     Multiple Vitamin (Multi Vitamin Daily) TABS, Take by mouth daily, Disp: , Rfl:     omeprazole (PriLOSEC) 20 mg delayed release capsule, Take 20 mg by mouth 2 (two) times a day, Disp: , Rfl:     ondansetron (ZOFRAN-ODT) 4 mg disintegrating tablet, , Disp: , Rfl:     tacrolimus (PROTOPIC) 0.1 % ointment, , Disp: , Rfl:     vilazodone (VIIBRYD) 20 mg tablet, Take 20 mg by mouth daily Takes 20 mg and 10 mg to equal 30 mg daily. , Disp: , Rfl:     ibuprofen (MOTRIN) 400 mg tablet, Take by mouth every 6 (six) hours as needed for mild pain (Patient not taking: Reported on 6/30/2022), Disp: , Rfl:     oxiconazole (OXISTAT) 1 % CREA, , Disp: , Rfl:     traMADol (ULTRAM) 50 mg tablet, Take 50 mg by mouth every 6 (six) hours as needed for moderate pain (Pt takes for severe pain.) (Patient not taking: Reported on 6/30/2022), Disp: , Rfl:   Allergies   Allergen Reactions    Bee Pollen Hives    Gluten Meal - Food Allergy GI Intolerance     Reported Celiac's disease    Pollen Extract Other (See Comments)       Cardiac Test Results:     Echo exercise stress test 6/21/2022:  Jerzy protocol. 17.2 METs. No chest pain during stress. Stress ECG was negative for ischemia. EF 65%. LV cavity has normal reduction in size post-stress. The left ventricle systolic function is hyperdynamic post-stress. The post-stress echo showed normal wall motion which was hyperdynamic compared to baseline. ECG 5/31/2022: Atrial paced rhythm. ZIO 12/1/-12/15/2021:  Min HR of 34 bpm, max HR of 178 bpm, and avg HR of 62bpm.   Predominant underlying rhythm was Sinus Rhythm. Rare PAC's and rare PVC's. Echocardiogram 11/9/2021:  EF 65%. Left atrium mildly dilated. Trace MR. Trace TR. ECG 11/8/2021: Sinus bradycardia. Poor anterior R wave progression is noted. Review of Systems   Constitutional:  Positive for fatigue and unexpected weight change (weight gain). Negative for activity change and appetite change.    HENT:  Negative for congestion, hearing loss, tinnitus and trouble swallowing. Eyes:  Negative for visual disturbance. Respiratory:  Negative for cough, chest tightness, shortness of breath and wheezing. Cardiovascular:  Positive for palpitations. Negative for chest pain and leg swelling. Gastrointestinal:  Negative for abdominal distention, abdominal pain, nausea and vomiting. Genitourinary:  Negative for difficulty urinating. Musculoskeletal:  Negative for arthralgias. Skin:  Negative for rash. Dry and flaky. Neurological:  Negative for dizziness, syncope and light-headedness. Hematological:  Does not bruise/bleed easily. Psychiatric/Behavioral:  Negative for confusion. The patient is not nervous/anxious. All other systems reviewed and are negative. Vitals:    03/08/23 0852 03/08/23 0915 03/08/23 0917   BP: (P) 124/70 128/78 122/80   BP Location: (P) Right arm Left arm Right arm   Patient Position: (P) Sitting     Cuff Size: (P) Standard     Pulse: 88     SpO2: 97%     Weight: 124 kg (274 lb)     Height: 6' (1.829 m)       Vitals:    03/08/23 0852   Weight: 124 kg (274 lb)     Height: 6' (182.9 cm)     Physical Exam  Vitals reviewed. Constitutional:       Appearance: He is well-developed. HENT:      Head: Normocephalic and atraumatic. Eyes:      Conjunctiva/sclera: Conjunctivae normal.      Pupils: Pupils are equal, round, and reactive to light. Neck:      Vascular: No JVD. Cardiovascular:      Rate and Rhythm: Normal rate and regular rhythm. Heart sounds: Normal heart sounds. No murmur heard. No friction rub. No gallop. Pulmonary:      Effort: Pulmonary effort is normal.      Breath sounds: Normal breath sounds. Abdominal:      General: Bowel sounds are normal.      Palpations: Abdomen is soft. Musculoskeletal:      Cervical back: Normal range of motion. Skin:     General: Skin is warm and dry.    Neurological:      Mental Status: He is alert and oriented to person, place, and time.   Psychiatric:         Behavior: Behavior normal.

## 2023-03-08 NOTE — PATIENT INSTRUCTIONS
I would start propranolol 10 mg twice daily. Send me a portal message with how you are doing. Have endocrinology send me a copy of your office visit note.

## 2023-06-09 RX ORDER — LANOLIN ALCOHOL/MO/W.PET/CERES
CREAM (GRAM) TOPICAL
COMMUNITY
Start: 2023-05-15

## 2023-06-09 RX ORDER — VILAZODONE HYDROCHLORIDE 10 MG/1
TABLET ORAL
COMMUNITY
Start: 2023-03-23

## 2023-06-14 ENCOUNTER — OFFICE VISIT (OUTPATIENT)
Dept: CARDIOLOGY CLINIC | Facility: CLINIC | Age: 43
End: 2023-06-14
Payer: COMMERCIAL

## 2023-06-14 VITALS
HEART RATE: 92 BPM | TEMPERATURE: 97 F | BODY MASS INDEX: 37.9 KG/M2 | OXYGEN SATURATION: 95 % | SYSTOLIC BLOOD PRESSURE: 112 MMHG | HEIGHT: 72 IN | WEIGHT: 279.8 LBS | DIASTOLIC BLOOD PRESSURE: 78 MMHG

## 2023-06-14 DIAGNOSIS — G47.33 OSA (OBSTRUCTIVE SLEEP APNEA): ICD-10-CM

## 2023-06-14 DIAGNOSIS — R00.1 BRADYCARDIA: ICD-10-CM

## 2023-06-14 DIAGNOSIS — E66.09 CLASS 2 OBESITY DUE TO EXCESS CALORIES WITHOUT SERIOUS COMORBIDITY WITH BODY MASS INDEX (BMI) OF 37.0 TO 37.9 IN ADULT: ICD-10-CM

## 2023-06-14 DIAGNOSIS — R00.2 PALPITATIONS: ICD-10-CM

## 2023-06-14 DIAGNOSIS — Z95.0 PACEMAKER: ICD-10-CM

## 2023-06-14 DIAGNOSIS — R07.2 PRECORDIAL PAIN: ICD-10-CM

## 2023-06-14 DIAGNOSIS — R55 SYNCOPE, UNSPECIFIED SYNCOPE TYPE: Primary | ICD-10-CM

## 2023-06-14 PROBLEM — E66.812 CLASS 2 OBESITY DUE TO EXCESS CALORIES WITHOUT SERIOUS COMORBIDITY WITH BODY MASS INDEX (BMI) OF 37.0 TO 37.9 IN ADULT: Status: ACTIVE | Noted: 2022-02-19

## 2023-06-14 PROCEDURE — 99214 OFFICE O/P EST MOD 30 MIN: CPT | Performed by: NURSE PRACTITIONER

## 2023-06-14 RX ORDER — PROPRANOLOL HYDROCHLORIDE 20 MG/1
20 TABLET ORAL 2 TIMES DAILY
Qty: 180 TABLET | Refills: 3 | Status: SHIPPED | OUTPATIENT
Start: 2023-06-14 | End: 2023-06-20 | Stop reason: SDUPTHER

## 2023-06-14 RX ORDER — METHYLPHENIDATE HYDROCHLORIDE 36 MG/1
72 TABLET, EXTENDED RELEASE ORAL
COMMUNITY
Start: 2023-06-12

## 2023-06-14 RX ORDER — MULTIVIT-MIN/IRON/FOLIC ACID/K 18-600-40
CAPSULE ORAL
COMMUNITY

## 2023-06-14 RX ORDER — KETOCONAZOLE 20 MG/G
CREAM TOPICAL
COMMUNITY
Start: 2023-05-04

## 2023-06-14 RX ORDER — LANOLIN ALCOHOL/MO/W.PET/CERES
400 CREAM (GRAM) TOPICAL DAILY
Qty: 30 TABLET | Refills: 11 | Status: SHIPPED | OUTPATIENT
Start: 2023-06-14

## 2023-06-14 NOTE — ASSESSMENT & PLAN NOTE
Medtronic dual chamber PPM insertion at [facility] 3/18/22 for indication of symptomatic bradycardia with syncope  [facility] is managing

## 2023-06-14 NOTE — ASSESSMENT & PLAN NOTE
"Patient notes episodes of rapid heart rates in associated with anxiety/stress  Also now reports \"pounding\"  Device interrogations have revealed SVT vs ST  He notes improvement in symptoms with addition of propranolol 10 mg BID  Of note, currently undergoing endocrinology evaluation for a suppressed TSH  Not currently on prescription treatment  Increase propranolol to 20 mg BID and will assess response  Instructed on addition of daily magnesium supplement as well    "
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
## 2023-06-14 NOTE — ASSESSMENT & PLAN NOTE
Patient reports an episode of mid sternal chest pressure while at work on Monday which was relieved with aspirin and nitro  He underwent a stress echo 6/21/2022 which showed no evidence of ischemia at target heart rate  EF 65%  We discussed further evaluation  He declines updated stress testing  He is agreeable to a cardiac CTA for further investigation of CAD  Cardiac risk factors include obesity and family history of HTN and DM  We have reviewed urgent s/s to report and when to seek immediate medical attention  I have increased his beta blocker at this time and will assess response

## 2023-06-14 NOTE — PATIENT INSTRUCTIONS
I will have Aranza Barcenas check your device from Monday and ask about an in person device check  Increase propranolol to 20  mg twice daily  Add magnesium supplement daily - if loose stools, cut in half  Cardiac CTA to assess for CAD  Your heart rate should be low for this test  I will confirm with them about your pacemaker setting  Take an Ativan and 40 mg of propranolol 1 hour prior to the test (have a )  I ordered blood work to be done prior to the CT

## 2023-06-14 NOTE — ASSESSMENT & PLAN NOTE
Body mass index is 37 95 kg/m²  Continues with steady weight gain  Reviewed health risks associated with obesity  Currently undergoing work up by endocrinology  I have urged improved dietary compliance

## 2023-06-14 NOTE — ASSESSMENT & PLAN NOTE
Pt has a history of syncope in his teenage years along with a history of bradycardia/junctional rhythm  He was evaluated by cardiology in [place] at that time and diagnosed with vasovagal syncope  He was prescribed midodrine at that time  He does report episodes of near-syncope in the recent past, evaluated by his PCP with ECG in office showing bradycardia in 40's  Recurrent witnessed syncopal events - at home after surgery and then 2 witnessed events in the ER  Telemetry review indicates heart rate in the 40's the evening of hospital admission  Strips reviewed with EP, who felt this was consistent with vasovagal bradycardia  Echo 11/9/2021 with EF 65%  14 day AT O 12/1-12/15/21 reveals HR  with avg 62 bpm  Heart rate does go down into the 30's while awake  No high grade block or pauses identified  Met with Jenny Hudson and subsequently underwent dual chamber PPM insertion 3/18/22 with complete resolution of symptoms  No recurrent syncope since PPM insertion

## 2023-06-15 ENCOUNTER — PATIENT MESSAGE (OUTPATIENT)
Dept: CARDIOLOGY CLINIC | Facility: CLINIC | Age: 43
End: 2023-06-15

## 2023-06-15 ENCOUNTER — IN-CLINIC DEVICE VISIT (OUTPATIENT)
Dept: CARDIOLOGY CLINIC | Facility: CLINIC | Age: 43
End: 2023-06-15
Payer: COMMERCIAL

## 2023-06-15 DIAGNOSIS — Z95.0 PRESENCE OF CARDIAC PACEMAKER: Primary | ICD-10-CM

## 2023-06-15 DIAGNOSIS — R00.2 PALPITATIONS: ICD-10-CM

## 2023-06-15 PROCEDURE — 93296 REM INTERROG EVL PM/IDS: CPT | Performed by: INTERNAL MEDICINE

## 2023-06-15 PROCEDURE — 93294 REM INTERROG EVL PM/LDLS PM: CPT | Performed by: INTERNAL MEDICINE

## 2023-06-15 NOTE — PROGRESS NOTES
Results for orders placed or performed in visit on 06/15/23   Cardiac EP device report    Narrative    MDT DUAL PM/ ACTIVE SYSTEM IS MRI CONDITIONAL  DEVICE INTERROGATED IN THE [place] OFFICE: BATTERY VOLTAGE ADEQUATE (10 5 YRS)  AP: 47 2%  : 24 9%  ALL AVAILABLE LEAD PARAMETERS WITHIN NORMAL LIMITS  4 VT EPISODES W/ AVAIL EGMS SHOWING EPISODE # 273 NSVT 8 BEATS @ 167 BPM, ALL OTHER EPISODES SHOWING SVT-ST 14 BEATS @ 154 BM, 14 BEATS @ 174 BPM & 16 BEATS @ 158 BPM  666 AT/AF EPISODES W/ AVAIL EGMS SHOWING SVT-ST W/ -162 BPM, MAX DURATION 2 MINS 9 SECS  PT TAKES INDERAL  EF: 65% (ST ECHO 6/21/22)  DUE TO HIGH RV THRESHOLD BIPOLAR (1 50 @   40 ms) REPROGRAMMED TO UNIPOLAR PACING W/ /RV THRESHOLD ([email or web address])  PACEMAKER FUNCTIONING APPROPRIATELY    [address] [place] [address] Street

## 2023-06-20 RX ORDER — PROPRANOLOL HYDROCHLORIDE 40 MG/1
40 TABLET ORAL 2 TIMES DAILY
Qty: 60 TABLET | Refills: 11 | Status: SHIPPED | OUTPATIENT
Start: 2023-06-20

## 2023-06-21 ENCOUNTER — TELEPHONE (OUTPATIENT)
Dept: CARDIOLOGY CLINIC | Facility: CLINIC | Age: 43
End: 2023-06-21

## 2023-06-21 NOTE — TELEPHONE ENCOUNTER
I contacted [email or web address] requesting that a rep be present for Ancelmo's cardiac CTA to lower his low rate for the test and then return to low rate of 70 after the test  The request has been placed  I do not see a time for his cardiac CTA and will need to confirm the time and call back

## 2023-06-22 NOTE — TELEPHONE ENCOUNTER
Updated Farhan from [employer] that time of testing is 1:30  He is aware and a rep will be available

## 2023-07-11 ENCOUNTER — HOSPITAL ENCOUNTER (OUTPATIENT)
Dept: CT IMAGING | Facility: HOSPITAL | Age: 43
Discharge: HOME | End: 2023-07-11
Payer: COMMERCIAL

## 2023-07-11 VITALS — DIASTOLIC BLOOD PRESSURE: 71 MMHG | RESPIRATION RATE: 20 BRPM | SYSTOLIC BLOOD PRESSURE: 112 MMHG | HEART RATE: 60 BPM

## 2023-07-11 DIAGNOSIS — R07.2 PRECORDIAL PAIN: ICD-10-CM

## 2023-07-11 PROCEDURE — 75574 CT ANGIO HRT W/3D IMAGE: CPT

## 2023-07-11 PROCEDURE — G1004 CDSM NDSC: HCPCS

## 2023-07-11 RX ORDER — NITROGLYCERIN 0.4 MG/1
0.4 TABLET SUBLINGUAL ONCE
Status: COMPLETED | OUTPATIENT
Start: 2023-07-11 | End: 2023-07-11

## 2023-07-11 RX ORDER — METOPROLOL TARTRATE 5 MG/5ML
5 INJECTION INTRAVENOUS
Status: DISCONTINUED | OUTPATIENT
Start: 2023-07-11 | End: 2023-07-12 | Stop reason: HOSPADM

## 2023-07-11 RX ADMIN — NITROGLYCERIN 0.4 MG: 0.4 TABLET SUBLINGUAL at 13:34

## 2023-07-11 RX ADMIN — IOHEXOL 80 ML: 350 INJECTION, SOLUTION INTRAVENOUS at 13:38

## 2023-07-11 NOTE — NURSING NOTE
Patient arrived for cardiac cta. Test/medications reviewed. Pt took additional dose propanolol per cardiology. Pt denies pde5 inhibitor use. Present [employer] rep, Kinjal, set low rate to PPM AAI/DDD  at 60 for test. Patient tolerated test well. Encouraged increased fluids remainder of day. See vitals flowsheet. Upon test completion, Kinjal set patient low rate back to usual setting of 70. Pt asymptomatic upon departure with spouse.

## 2023-09-08 ENCOUNTER — PATIENT MESSAGE (OUTPATIENT)
Dept: CARDIOLOGY CLINIC | Facility: CLINIC | Age: 43
End: 2023-09-08

## 2023-09-08 DIAGNOSIS — R00.2 PALPITATIONS: ICD-10-CM

## 2023-09-09 RX ORDER — PROPRANOLOL HYDROCHLORIDE 40 MG/1
40 TABLET ORAL 2 TIMES DAILY
Qty: 60 TABLET | Refills: 0 | Status: SHIPPED | OUTPATIENT
Start: 2023-09-09 | End: 2023-09-14 | Stop reason: SDUPTHER

## 2023-09-14 RX ORDER — PROPRANOLOL HYDROCHLORIDE 40 MG/1
40 TABLET ORAL 2 TIMES DAILY
Qty: 180 TABLET | Refills: 3 | Status: SHIPPED | OUTPATIENT
Start: 2023-09-14

## 2023-09-15 ENCOUNTER — REMOTE DEVICE CLINIC VISIT (OUTPATIENT)
Dept: CARDIOLOGY CLINIC | Facility: CLINIC | Age: 43
End: 2023-09-15
Payer: COMMERCIAL

## 2023-09-15 DIAGNOSIS — Z95.0 CARDIAC PACEMAKER IN SITU: Primary | ICD-10-CM

## 2023-09-15 PROCEDURE — 93296 REM INTERROG EVL PM/IDS: CPT | Performed by: INTERNAL MEDICINE

## 2023-09-15 PROCEDURE — 93294 REM INTERROG EVL PM/LDLS PM: CPT | Performed by: INTERNAL MEDICINE

## 2023-09-15 NOTE — PROGRESS NOTES
Results for orders placed or performed in visit on 09/15/23   Cardiac EP device report    Narrative    MDT DUAL PM/ ACTIVE SYSTEM IS MRI CONDITIONAL  [employer] TRANSMISSION: BATTERY VOLTAGE ADEQUATE (10.9 YRS). AP-54%, -17%. ALL AVAILABLE LEAD PARAMETERS WITHIN NORMAL LIMITS. 9 SVT-ST EPISODES @ 158-176 BPM MAX DURATION 3.75 MINS. PT ON PROPRANOLOL. 5,717 RATE DROP RESPONSE EPISODES. NORMAL DEVICE FUNCTION.  GV

## 2023-10-04 ENCOUNTER — TELEPHONE (OUTPATIENT)
Dept: CARDIOLOGY CLINIC | Facility: CLINIC | Age: 43
End: 2023-10-04

## 2023-10-13 ENCOUNTER — HOSPITAL ENCOUNTER (EMERGENCY)
Facility: HOSPITAL | Age: 43
Discharge: HOME | End: 2023-10-13
Attending: EMERGENCY MEDICINE
Payer: COMMERCIAL

## 2023-10-13 VITALS
BODY MASS INDEX: 39.28 KG/M2 | HEIGHT: 72 IN | OXYGEN SATURATION: 96 % | DIASTOLIC BLOOD PRESSURE: 72 MMHG | TEMPERATURE: 98.1 F | RESPIRATION RATE: 20 BRPM | WEIGHT: 290 LBS | SYSTOLIC BLOOD PRESSURE: 120 MMHG | HEART RATE: 91 BPM

## 2023-10-13 DIAGNOSIS — U07.1 COVID-19: Primary | ICD-10-CM

## 2023-10-13 LAB
ALBUMIN SERPL BCP-MCNC: 4.6 G/DL (ref 3.5–5)
ALP SERPL-CCNC: 101 U/L (ref 34–104)
ALT SERPL W P-5'-P-CCNC: 22 U/L (ref 7–52)
ANION GAP SERPL CALCULATED.3IONS-SCNC: 7 MMOL/L
AST SERPL W P-5'-P-CCNC: 19 U/L (ref 13–39)
BASOPHILS # BLD AUTO: 0.03 THOUSANDS/ÂΜL (ref 0–0.1)
BASOPHILS NFR BLD AUTO: 0 % (ref 0–1)
BILIRUB SERPL-MCNC: 0.85 MG/DL (ref 0.2–1)
BUN SERPL-MCNC: 10 MG/DL (ref 5–25)
CALCIUM SERPL-MCNC: 9.1 MG/DL (ref 8.4–10.2)
CHLORIDE SERPL-SCNC: 106 MMOL/L (ref 96–108)
CO2 SERPL-SCNC: 25 MMOL/L (ref 21–32)
CREAT SERPL-MCNC: 0.91 MG/DL (ref 0.6–1.3)
EOSINOPHIL # BLD AUTO: 0.07 THOUSAND/ÂΜL (ref 0–0.61)
EOSINOPHIL NFR BLD AUTO: 1 % (ref 0–6)
ERYTHROCYTE [DISTWIDTH] IN BLOOD BY AUTOMATED COUNT: 12.5 % (ref 11.6–15.1)
GFR SERPL CREATININE-BSD FRML MDRD: 103 ML/MIN/1.73SQ M
GLUCOSE SERPL-MCNC: 105 MG/DL (ref 65–140)
HCT VFR BLD AUTO: 43.5 % (ref 36.5–49.3)
HGB BLD-MCNC: 14.7 G/DL (ref 12–17)
IMM GRANULOCYTES # BLD AUTO: 0.05 THOUSAND/UL (ref 0–0.2)
IMM GRANULOCYTES NFR BLD AUTO: 1 % (ref 0–2)
LACTATE SERPL-SCNC: 0.7 MMOL/L (ref 0.5–2)
LYMPHOCYTES # BLD AUTO: 0.74 THOUSANDS/ÂΜL (ref 0.6–4.47)
LYMPHOCYTES NFR BLD AUTO: 7 % (ref 14–44)
MCH RBC QN AUTO: 28.7 PG (ref 26.8–34.3)
MCHC RBC AUTO-ENTMCNC: 33.8 G/DL (ref 31.4–37.4)
MCV RBC AUTO: 85 FL (ref 82–98)
MONOCYTES # BLD AUTO: 0.72 THOUSAND/ÂΜL (ref 0.17–1.22)
MONOCYTES NFR BLD AUTO: 7 % (ref 4–12)
NEUTROPHILS # BLD AUTO: 9.36 THOUSANDS/ÂΜL (ref 1.85–7.62)
NEUTS SEG NFR BLD AUTO: 84 % (ref 43–75)
NRBC BLD AUTO-RTO: 0 /100 WBCS
PLATELET # BLD AUTO: 298 THOUSANDS/UL (ref 149–390)
PMV BLD AUTO: 9.5 FL (ref 8.9–12.7)
POTASSIUM SERPL-SCNC: 3.7 MMOL/L (ref 3.5–5.3)
PROT SERPL-MCNC: 7 G/DL (ref 6.4–8.4)
RBC # BLD AUTO: 5.13 MILLION/UL (ref 3.88–5.62)
SODIUM SERPL-SCNC: 138 MMOL/L (ref 135–147)
WBC # BLD AUTO: 10.97 THOUSAND/UL (ref 4.31–10.16)

## 2023-10-13 PROCEDURE — 80053 COMPREHEN METABOLIC PANEL: CPT | Performed by: EMERGENCY MEDICINE

## 2023-10-13 PROCEDURE — 36415 COLL VENOUS BLD VENIPUNCTURE: CPT | Performed by: EMERGENCY MEDICINE

## 2023-10-13 PROCEDURE — 85025 COMPLETE CBC W/AUTO DIFF WBC: CPT | Performed by: EMERGENCY MEDICINE

## 2023-10-13 PROCEDURE — C9113 INJ PANTOPRAZOLE SODIUM, VIA: HCPCS | Performed by: EMERGENCY MEDICINE

## 2023-10-13 PROCEDURE — 83605 ASSAY OF LACTIC ACID: CPT | Performed by: EMERGENCY MEDICINE

## 2023-10-13 PROCEDURE — 99285 EMERGENCY DEPT VISIT HI MDM: CPT | Performed by: EMERGENCY MEDICINE

## 2023-10-13 RX ORDER — PANTOPRAZOLE SODIUM 40 MG/10ML
40 INJECTION, POWDER, LYOPHILIZED, FOR SOLUTION INTRAVENOUS ONCE
Status: COMPLETED | OUTPATIENT
Start: 2023-10-13 | End: 2023-10-13

## 2023-10-13 RX ORDER — KETOROLAC TROMETHAMINE 30 MG/ML
15 INJECTION, SOLUTION INTRAMUSCULAR; INTRAVENOUS ONCE
Status: COMPLETED | OUTPATIENT
Start: 2023-10-13 | End: 2023-10-13

## 2023-10-13 RX ORDER — ONDANSETRON 2 MG/ML
4 INJECTION INTRAMUSCULAR; INTRAVENOUS ONCE
Status: COMPLETED | OUTPATIENT
Start: 2023-10-13 | End: 2023-10-13

## 2023-10-13 RX ADMIN — SODIUM CHLORIDE 1000 ML: 0.9 INJECTION, SOLUTION INTRAVENOUS at 06:26

## 2023-10-13 RX ADMIN — KETOROLAC TROMETHAMINE 15 MG: 30 INJECTION, SOLUTION INTRAMUSCULAR; INTRAVENOUS at 06:27

## 2023-10-13 RX ADMIN — ONDANSETRON 4 MG: 2 INJECTION INTRAMUSCULAR; INTRAVENOUS at 06:27

## 2023-10-13 RX ADMIN — PANTOPRAZOLE SODIUM 40 MG: 40 INJECTION, POWDER, FOR SOLUTION INTRAVENOUS at 06:27

## 2023-10-13 NOTE — Clinical Note
Candy Hciks was seen and treated in our emergency department on 10/13/2023. Diagnosis:     Dolph Ace  may return to work on return date. He may return on this date: 10/18/2023    And mask for another 5 days     If you have any questions or concerns, please don't hesitate to call.       Rama Winters, DO    ______________________________           _______________          _______________  Hospital Representative                              Date                                Time

## 2023-10-13 NOTE — ED PROVIDER NOTES
History  Chief Complaint   Patient presents with    Nausea     Patient woke up with fever, tachycardia, tested COVID + today. Cough, fever/chills, body aches and pains, nausea. Patient took Tylenol @ 360.     52-year-old male states he woke up prior to arrival with fever, chills, shakes, tested himself for COVID and was positive. 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 No chest pain or dyspnea. No hemoptysis. History provided by:  Patient  Nausea  The primary symptoms include fever, abdominal pain, nausea and myalgias. Primary symptoms do not include rash. The illness began yesterday. The onset was gradual.   The illness is also significant for chills. Associated medical issues comments: "Sensitive stomach". Prior to Admission Medications   Prescriptions Last Dose Informant Patient Reported? Taking?    Hyoscyamine Sulfate SL 0.125 MG SUBL  Self Yes No   Sig: Place under the tongue as needed   LORazepam (ATIVAN) 0.5 mg tablet  Self Yes No   Sig: Take by mouth every 6 (six) hours as needed for anxiety   Methylphenidate HCl ER 36 MG TB24   Yes No   Si mg   Multiple Vitamin (Multi Vitamin Daily) TABS  Self Yes No   Sig: Take by mouth daily   acetaminophen (TYLENOL) 500 mg tablet  Self Yes No   Sig: Take 500 mg by mouth every 6 (six) hours as needed for mild pain   cholecalciferol (VITAMIN D3) 1,000 units tablet  Self Yes No   Sig: Take 2,000 Units by mouth daily     gabapentin (NEURONTIN) 300 mg capsule  Self Yes No   Sig: Take 300 mg by mouth 3 (three) times a day   ketoconazole (NIZORAL) 2 % cream   Yes No   ketoconazole (NIZORAL) 2 % shampoo  Self Yes No   magnesium Oxide (MAG-OX) 400 mg TABS   No No   Sig: Take 1 tablet (400 mg total) by mouth daily   omeprazole (PriLOSEC) 20 mg delayed release capsule   Yes No   Sig: Take 20 mg by mouth 2 (two) times a day   ondansetron (ZOFRAN-ODT) 4 mg disintegrating tablet   Yes No   oxiconazole (OXISTAT) 1 % CREA  Self Yes No   propranolol (INDERAL) 40 mg tablet No No   Sig: Take 1 tablet (40 mg total) by mouth 2 (two) times a day   tacrolimus (PROTOPIC) 0.1 % ointment   Yes No   traMADol (ULTRAM) 50 mg tablet  Self Yes No   Sig: Take 50 mg by mouth every 6 (six) hours as needed for moderate pain (Pt takes for severe pain.)   vilazodone (VIIBRYD) 10 mg tablet   Yes No   vilazodone (VIIBRYD) 20 mg tablet  Self Yes No   Sig: Take 20 mg by mouth daily Takes 20 mg and 10 mg to equal 30 mg daily. Facility-Administered Medications: None       Past Medical History:   Diagnosis Date    Anemia     Anxiety     BMI 37.0-37.9, adult     Bradycardia     CPAP (continuous positive airway pressure) dependence     Pt states he is compliant with use. Depression     Gastritis     GERD (gastroesophageal reflux disease)     History of ileus     Pt states it was caused by antibiotic use. Idiopathic pancreatitis     Irritable bowel syndrome     Sleep apnea     Syncope     in teen years, saw cardiology in [place], given Midrodrine. Diagnosed vasovagal at the time. Thrombosed hemorrhoids     Ventral hernia     Vitamin D deficiency        Past Surgical History:   Procedure Laterality Date    ANKLE FRACTURE SURGERY Left     CARDIAC ELECTROPHYSIOLOGY PROCEDURE N/A 03/18/2022    Procedure: Cardiac icd implant/ DUAL CHAMBER PPM;  Surgeon: Theron Burgess MD;  Location:  CARDIAC CATH LAB; Service: Cardiology    CARDIAC PACEMAKER PLACEMENT      CHOLECYSTECTOMY      HERNIA REPAIR      INGUINAL HERNIA REPAIR Bilateral     Pt reports having as a child.     PA RPR RECRT INCAL/VNT HERNIA REDUCIBLE N/A 11/08/2021    Procedure: VENTRAL HERNIA REPAIR;  Surgeon: Lowell Mcdermott DO;  Location: [facility];  Service: General    VENTRAL HERNIA REPAIR N/A 02/10/2020    Procedure: REPAIR HERNIA VENTRAL;  Surgeon: Lowell Mcdermott DO;  Location: [facility];  Service: General       Family History   Problem Relation Age of Onset    Hypertension Mother     Hyperlipidemia Mother     Diabetes Mother     Hypertension Father      I have reviewed and agree with the history as documented. E-Cigarette/Vaping    E-Cigarette Use Never User      E-Cigarette/Vaping Substances     Social History     Tobacco Use    Smoking status: Never    Smokeless tobacco: Never   Vaping Use    Vaping Use: Never used   Substance Use Topics    Alcohol use: Yes     Comment: rarely    Drug use: Not Currently       Review of Systems   Constitutional:  Positive for chills and fever. Gastrointestinal:  Positive for abdominal pain and nausea. Musculoskeletal:  Positive for myalgias. Skin:  Negative for rash. All other systems reviewed and are negative. Physical Exam  Physical Exam  Vitals and nursing note reviewed. Constitutional:       General: He is not in acute distress. Appearance: He is obese. Comments: Pleasant, comfortable-appearing   HENT:      Head: Normocephalic and atraumatic. Mouth/Throat:      Mouth: Mucous membranes are moist.      Pharynx: Oropharynx is clear. Eyes:      Conjunctiva/sclera: Conjunctivae normal.      Pupils: Pupils are equal, round, and reactive to light. Cardiovascular:      Rate and Rhythm: Normal rate and regular rhythm. Heart sounds: Normal heart sounds. Pulmonary:      Effort: Pulmonary effort is normal.      Breath sounds: Normal breath sounds. Abdominal:      General: Bowel sounds are normal. There is no distension. Palpations: Abdomen is soft. Tenderness: There is no abdominal tenderness. Musculoskeletal:         General: No deformity. Cervical back: Neck supple. Right lower leg: No edema. Left lower leg: No edema. Skin:     General: Skin is warm and dry. Findings: No rash. Neurological:      General: No focal deficit present. Mental Status: He is alert and oriented to person, place, and time. Cranial Nerves: No cranial nerve deficit. Motor: No weakness.       Coordination: Coordination normal. Psychiatric:         Behavior: Behavior normal.         Thought Content: Thought content normal.         Judgment: Judgment normal.         Vital Signs  ED Triage Vitals [10/13/23 0551]   Temperature Pulse Respirations Blood Pressure SpO2   98.1 °F (36.7 °C) 98 20 127/72 96 %      Temp Source Heart Rate Source Patient Position - Orthostatic VS BP Location FiO2 (%)   Temporal Monitor Lying Right arm --      Pain Score       7           Vitals:    10/13/23 0551 10/13/23 0645   BP: 127/72 116/78   Pulse: 98 86   Patient Position - Orthostatic VS: Lying          Visual Acuity      ED Medications  Medications   sodium chloride 0.9 % bolus 1,000 mL (1,000 mL Intravenous New Bag 10/13/23 0626)   ketorolac (TORADOL) injection 15 mg (15 mg Intravenous Given 10/13/23 0627)   ondansetron (ZOFRAN) injection 4 mg (4 mg Intravenous Given 10/13/23 0627)   pantoprazole (PROTONIX) injection 40 mg (40 mg Intravenous Given 10/13/23 5062)       Diagnostic Studies  Results Reviewed       Procedure Component Value Units Date/Time    Lactic acid, plasma (w/reflex if result > 2.0) [[id number]]  (Normal) Collected: 10/13/23 0625    Lab Status: Final result Specimen: Blood from Arm, Left Updated: 10/13/23 0650     LACTIC ACID 0.7 mmol/L     Narrative:      Result may be elevated if tourniquet was used during collection.     Comprehensive metabolic panel [[id number]] Collected: 10/13/23 0625    Lab Status: Final result Specimen: Blood from Arm, Left Updated: 10/13/23 0645     Sodium 138 mmol/L      Potassium 3.7 mmol/L      Chloride 106 mmol/L      CO2 25 mmol/L      ANION GAP 7 mmol/L      BUN 10 mg/dL      Creatinine 0.91 mg/dL      Glucose 105 mg/dL      Calcium 9.1 mg/dL      AST 19 U/L      ALT 22 U/L      Alkaline Phosphatase 101 U/L      Total Protein 7.0 g/dL      Albumin 4.6 g/dL      Total Bilirubin 0.85 mg/dL      eGFR 103 ml/min/1.73sq m     Narrative:      [place] guidelines for Chronic Kidney Disease (CKD):     Stage 1 with normal or high GFR (GFR > 90 mL/min/1.73 square meters)    Stage 2 Mild CKD (GFR = 60-89 mL/min/1.73 square meters)    Stage 3A Moderate CKD (GFR = 45-59 mL/min/1.73 square meters)    Stage 3B Moderate CKD (GFR = 30-44 mL/min/1.73 square meters)    Stage 4 Severe CKD (GFR = 15-29 mL/min/1.73 square meters)    Stage 5 End Stage CKD (GFR <15 mL/min/1.73 square meters)  Note: GFR calculation is accurate only with a steady state creatinine    CBC and differential [[id number]]  (Abnormal) Collected: 10/13/23 0625    Lab Status: Final result Specimen: Blood from Arm, Left Updated: 10/13/23 0631     WBC 10.97 Thousand/uL      RBC 5.13 Million/uL      Hemoglobin 14.7 g/dL      Hematocrit 43.5 %      MCV 85 fL      MCH 28.7 pg      MCHC 33.8 g/dL      RDW 12.5 %      MPV 9.5 fL      Platelets 102 Thousands/uL      nRBC 0 /100 WBCs      Neutrophils Relative 84 %      Immat GRANS % 1 %      Lymphocytes Relative 7 %      Monocytes Relative 7 %      Eosinophils Relative 1 %      Basophils Relative 0 %      Neutrophils Absolute 9.36 Thousands/µL      Immature Grans Absolute 0.05 Thousand/uL      Lymphocytes Absolute 0.74 Thousands/µL      Monocytes Absolute 0.72 Thousand/µL      Eosinophils Absolute 0.07 Thousand/µL      Basophils Absolute 0.03 Thousands/µL                    No orders to display              Procedures  Procedures         ED Course  ED Course as of 10/13/23 0708   Fri Oct 13, 2023   0654 LACTIC ACID: 0.7   0654 WBC(!): 10.97   0708 Note symptoms improved, discussed results and agreeable with close outpatient follow-up and will return immediately if worse or any new symptoms                               SBIRT 22yo+      Flowsheet Row Most Recent Value   Initial Alcohol Screen: US AUDIT-C     1. How often do you have a drink containing alcohol? 0 Filed at: 10/13/2023 0651   2. How many drinks containing alcohol do you have on a typical day you are drinking?   0 Filed at: 10/13/2023 3912 3a. Male UNDER 65: How often do you have five or more drinks on one occasion? 0 Filed at: 10/13/2023 0651   Audit-C Score 0 Filed at: 10/13/2023 0813   JENNY: How many times in the past year have you. .. Used an illegal drug or used a prescription medication for non-medical reasons? Never Filed at: 10/13/2023 9935                      Medical Decision Making  Amount and/or Complexity of Data Reviewed  Labs: ordered. Decision-making details documented in ED Course. ECG/medicine tests: ordered and independent interpretation performed. Decision-making details documented in ED Course. Risk  Prescription drug management. Disposition  Final diagnoses:   [id number]     Time reflects when diagnosis was documented in both MDM as applicable and the Disposition within this note       Time User Action Codes Description Comment    10/13/2023  6:55 AM Lanie Long Add [U07.1] COVID-19           ED Disposition       ED Disposition   Discharge    Condition   Stable    Date/Time   Fri Oct 13, 2023 2942    Comment   Ancelmo Lee discharge to home/self care. Follow-up Information       [facility] Schedule an appointment as soon as possible for a visit in 1 week  [facility] [address]  [phone]              Patient's Medications   Discharge Prescriptions    No medications on file       No discharge procedures on file.     PDMP Review       None            ED Provider  Electronically Signed by             Lanie Long DO  10/13/23 5371

## 2023-11-16 ENCOUNTER — OFFICE VISIT (OUTPATIENT)
Dept: CARDIOLOGY CLINIC | Facility: CLINIC | Age: 43
End: 2023-11-16
Payer: COMMERCIAL

## 2023-11-16 VITALS
TEMPERATURE: 98.2 F | HEIGHT: 72 IN | SYSTOLIC BLOOD PRESSURE: 102 MMHG | HEART RATE: 70 BPM | BODY MASS INDEX: 39.68 KG/M2 | OXYGEN SATURATION: 97 % | DIASTOLIC BLOOD PRESSURE: 70 MMHG | WEIGHT: 293 LBS

## 2023-11-16 DIAGNOSIS — R00.1 BRADYCARDIA: Primary | ICD-10-CM

## 2023-11-16 DIAGNOSIS — E66.09 CLASS 2 OBESITY DUE TO EXCESS CALORIES WITHOUT SERIOUS COMORBIDITY WITH BODY MASS INDEX (BMI) OF 37.0 TO 37.9 IN ADULT: ICD-10-CM

## 2023-11-16 DIAGNOSIS — G47.33 OSA (OBSTRUCTIVE SLEEP APNEA): ICD-10-CM

## 2023-11-16 DIAGNOSIS — R00.2 PALPITATIONS: ICD-10-CM

## 2023-11-16 DIAGNOSIS — R07.2 PRECORDIAL PAIN: ICD-10-CM

## 2023-11-16 DIAGNOSIS — Z95.0 CARDIAC PACEMAKER IN SITU: ICD-10-CM

## 2023-11-16 PROCEDURE — 99214 OFFICE O/P EST MOD 30 MIN: CPT | Performed by: STUDENT IN AN ORGANIZED HEALTH CARE EDUCATION/TRAINING PROGRAM

## 2023-11-16 PROCEDURE — 93000 ELECTROCARDIOGRAM COMPLETE: CPT | Performed by: STUDENT IN AN ORGANIZED HEALTH CARE EDUCATION/TRAINING PROGRAM

## 2023-11-16 RX ORDER — CETIRIZINE HYDROCHLORIDE 10 MG/1
10 TABLET ORAL DAILY
COMMUNITY
Start: 2023-10-29 | End: 2024-10-28

## 2023-11-16 RX ORDER — MECLIZINE HYDROCHLORIDE 25 MG/1
25 TABLET ORAL
COMMUNITY
Start: 2023-10-18

## 2023-11-16 RX ORDER — PANTOPRAZOLE SODIUM 40 MG/1
TABLET, DELAYED RELEASE ORAL
COMMUNITY
Start: 2023-10-18

## 2023-11-16 RX ORDER — DOXYCYCLINE HYCLATE 100 MG/1
100 CAPSULE ORAL 2 TIMES DAILY
COMMUNITY
Start: 2023-10-18

## 2023-11-16 RX ORDER — MONTELUKAST SODIUM 10 MG/1
TABLET ORAL
COMMUNITY
Start: 2023-11-14

## 2023-11-16 NOTE — PROGRESS NOTES
[facility] [address] [place] [place] [address]  Phone#  [phone]  Fax#  [phone]  [facility] Cardiology Office Follow-up Visit             NAME: Yenifer Nicolas  AGE: 44 y.o. SEX: male   : 1980   MRN: [id number]    DATE: 2023  TIME: 12:34 PM    Assessment and plan:  1. Bradycardia/ Cardiac pacemaker in situ  -EKG today A-paced rhythm at 70 bpm  -device interrogation in 9/15/2023: BATTERY VOLTAGE ADEQUATE (10.9 YRS). AP-54%, -17%. ALL AVAILABLE LEAD PARAMETERS WITHIN NORMAL LIMITS. 9 SVT-ST EPISODES @ 158-176 BPM MAX DURATION 3.75 MINS. NORMAL DEVICE FUNCTION. 2. Precordial pain. -no more episodes  2023: Ctca with score 0. Echo stress in 2022: 13 min 1 sec, stage 4, 103% MPHR, 172. METs, BP and Hr with good response. No echocardiographic evidence of stress induced ischemia    3. Palpitations  -no more episodes. Few SVT-ST on ppm. continue with propranolol    4. INDRA (obstructive sleep apnea)  -uses CPAP, feels rested with it    5. Class 2 obesity due to excess calories without serious comorbidity with body mass index (BMI) of 37.0 to 37.9 in adult  -gained about 14 lbs in the past 6 months. Attributes for stress. He is planning to see nutritionist.    Berna Birch 1 years      Cc: "follow up on cardiac problems"    HPI:  Yenifer Nicolas is a 44 y.o. male who presents to the cardiology clinic for follow up for the above-listed problems. He had covid-19 in 10/2023 and states that he feels more fatigue since that time but overall feels well from cardiac stand point. Past history, family history, social history, current medications, vital signs, recent lab and imaging studies and  prior cardiology studies reviewed independently on this visit. Review of Systems denies chest pain, palpitations, leg swelling, orthopnea, paroxysmal exertional dyspnea or syncope. Admits to occasional dyspnea on exertion.     Allergies   Allergen Reactions Bee Pollen Hives    Gluten Meal - Food Allergy GI Intolerance     Reported Celiac's disease    Pollen Extract Other (See Comments)       Current Outpatient Medications:     acetaminophen (TYLENOL) 500 mg tablet, Take 500 mg by mouth every 6 (six) hours as needed for mild pain, Disp: , Rfl:     cetirizine (ZyrTEC) 10 mg tablet, Take 10 mg by mouth daily, Disp: , Rfl:     cholecalciferol (VITAMIN D3) 1,000 units tablet, Take 2,000 Units by mouth daily  , Disp: , Rfl:     doxycycline hyclate (VIBRAMYCIN) 100 mg capsule, Take 100 mg by mouth 2 (two) times a day, Disp: , Rfl:     gabapentin (NEURONTIN) 300 mg capsule, Take 300 mg by mouth 3 (three) times a day, Disp: , Rfl:     Hyoscyamine Sulfate SL 0.125 MG SUBL, Place under the tongue as needed, Disp: , Rfl:     ketoconazole (NIZORAL) 2 % cream, , Disp: , Rfl:     ketoconazole (NIZORAL) 2 % shampoo, , Disp: , Rfl:     LORazepam (ATIVAN) 0.5 mg tablet, Take by mouth every 6 (six) hours as needed for anxiety, Disp: , Rfl:     magnesium Oxide (MAG-OX) 400 mg TABS, Take 1 tablet (400 mg total) by mouth daily, Disp: 30 tablet, Rfl: 11    meclizine (ANTIVERT) 25 mg tablet, Take 25 mg by mouth, Disp: , Rfl:     Methylphenidate HCl ER 36 MG TB24, 72 mg, Disp: , Rfl:     montelukast (SINGULAIR) 10 mg tablet, , Disp: , Rfl:     Multiple Vitamin (Multi Vitamin Daily) TABS, Take by mouth daily, Disp: , Rfl:     omeprazole (PriLOSEC) 20 mg delayed release capsule, Take 20 mg by mouth 2 (two) times a day, Disp: , Rfl:     ondansetron (ZOFRAN-ODT) 4 mg disintegrating tablet, , Disp: , Rfl:     oxiconazole (OXISTAT) 1 % CREA, , Disp: , Rfl:     pantoprazole (PROTONIX) 40 mg tablet, , Disp: , Rfl:     propranolol (INDERAL) 40 mg tablet, Take 1 tablet (40 mg total) by mouth 2 (two) times a day, Disp: 180 tablet, Rfl: 3    tacrolimus (PROTOPIC) 0.1 % ointment, , Disp: , Rfl:     vilazodone (VIIBRYD) 10 mg tablet, , Disp: , Rfl:     vilazodone (VIIBRYD) 20 mg tablet, Take 20 mg by mouth daily Takes 20 mg and 10 mg to equal 30 mg daily. , Disp: , Rfl:     traMADol (ULTRAM) 50 mg tablet, Take 50 mg by mouth every 6 (six) hours as needed for moderate pain (Pt takes for severe pain.) (Patient not taking: Reported on 11/16/2023), Disp: , Rfl:     Objective:   Vitals:    11/16/23 0853   BP: 102/70   Pulse: 70   Temp: 98.2 °F (36.8 °C)   SpO2: 97%     Wt Readings from Last 3 Encounters:   11/16/23 133 kg (293 lb)   10/13/23 132 kg (290 lb)   06/14/23 127 kg (279 lb 12.8 oz)     Pulse Readings from Last 3 Encounters:   11/16/23 70   10/13/23 91   07/11/23 60     BP Readings from Last 3 Encounters:   11/16/23 102/70   10/13/23 120/72   07/11/23 112/71     Physical Exam  General Appearance:    Alert, cooperative, no distress, appears stated age   Head:    atraumatic   Neck:   Supple,  no carotid  bruit or JVD   Lungs:     Clear to auscultation bilaterally, respirations unlabored   Chest wall:    No tenderness or deformity   Heart:    Regular rate and rhythm, S1 and S2 normal, no murmur, rub    or gallop   Abdomen:     Soft, non-tender, no organomegaly   Extremities:   Extremities no cyanosis or edema   Pulses:   2+ and symmetric all extremities     Pertinent Laboratory/Diagnostic Studies:    Laboratory studies reviewed personally by Husam Martinez DO    BMP:   Lab Results   Component Value Date    SODIUM 138 10/13/2023    K 3.7 10/13/2023     10/13/2023    CO2 25 10/13/2023    BUN 10 10/13/2023    CREATININE 0.91 10/13/2023    GLUC 105 10/13/2023    CALCIUM 9.1 10/13/2023     CBC:  Lab Results   Component Value Date    WBC 10.97 (H) 10/13/2023    HGB 14.7 10/13/2023    HCT 43.5 10/13/2023    MCV 85 10/13/2023     10/13/2023     Lipid Profile:   Lab Results   Component Value Date    HDL 43 11/17/2022     Lab Results   Component Value Date    LDLCALC 60 11/17/2022     Lab Results   Component Value Date    TRIG 81 11/17/2022      Other labs:  No results found for: "[id number]", "TSH"  Lab Results   Component Value Date    ALT 22 10/13/2023    AST 19 10/13/2023     Imaging Studies:  Pertinent imaging studies and cardiac studies were independently reviewed on this visit and findings summarized. Farheen Earl DO    Portions of the record may have been created with voice recognition software. Occasional wrong word or "sound alike" substitutions may have occurred due to the inherent limitations of voice recognition software. Read the chart carefully and recognize, using context, where substitutions have occurred. Please reach out to me directly for any clarifications.

## 2023-12-15 ENCOUNTER — REMOTE DEVICE CLINIC VISIT (OUTPATIENT)
Dept: CARDIOLOGY CLINIC | Facility: CLINIC | Age: 43
End: 2023-12-15
Payer: COMMERCIAL

## 2023-12-15 DIAGNOSIS — Z95.0 PRESENCE OF CARDIAC PACEMAKER: Primary | ICD-10-CM

## 2023-12-15 PROCEDURE — 93296 REM INTERROG EVL PM/IDS: CPT | Performed by: INTERNAL MEDICINE

## 2023-12-15 PROCEDURE — 93294 REM INTERROG EVL PM/LDLS PM: CPT | Performed by: INTERNAL MEDICINE

## 2023-12-15 NOTE — PROGRESS NOTES
Results for orders placed or performed in visit on 12/15/23   Cardiac EP device report    Narrative    MDT DUAL PM/ ACTIVE SYSTEM IS MRI CONDITIONAL  CARELINK TRANSMISSION: BATTERY VOLTAGE ADEQUATE(11.3 YRS). AP: 56.5%. : 16.2% (MVP-ON). ALL AVAILABLE LEAD PARAMETERS WITHIN NORMAL LIMITS. 1 VT EPISODE W/ EGM SHOWING NSVT 8 BEATS  @171 BPM. 22 FAST A&V EPISODES W/ AVAIL EGMS SHOWING SVT-ST W/ -182 BPM, MAX DURATION 8 MINS 42 SECS. PT TAKES INDERAL. EF: 65% (ST ECHO 6/21/22). NORMAL DEVICE FUNCTION.  [id number] [address]

## 2024-03-15 ENCOUNTER — REMOTE DEVICE CLINIC VISIT (OUTPATIENT)
Dept: CARDIOLOGY CLINIC | Facility: CLINIC | Age: 44
End: 2024-03-15
Payer: COMMERCIAL

## 2024-03-15 DIAGNOSIS — Z95.0 PRESENCE OF CARDIAC PACEMAKER: Primary | ICD-10-CM

## 2024-03-15 PROCEDURE — 93296 REM INTERROG EVL PM/IDS: CPT | Performed by: INTERNAL MEDICINE

## 2024-03-15 PROCEDURE — 93294 REM INTERROG EVL PM/LDLS PM: CPT | Performed by: INTERNAL MEDICINE

## 2024-03-15 NOTE — PROGRESS NOTES
Results for orders placed or performed in visit on 03/15/24   Cardiac EP device report    Narrative    MDT DUAL PM/ ACTIVE SYSTEM IS MRI CONDITIONAL  CARELINK TRANSMISSION: BATTERY VOLTAGE ADEQUATE (10.9 YRS). AP: 62.7%. : 18.7% (MVP-ON). ALL AVAILABLE LEAD PARAMETERS WITHIN NORMAL LIMITS. 1 VT EPISODE W/ EGM SHOWING NSVT 8 BEATS @ 200 BPM. 6 FAST A&V EPISODES W/ AVAIL EGMS SHOWING SVT-ST W/ -162 BPM, MAX DURATION 51 SECS. 8,959 RATE DROP EPISODES NOTED (HX OF SAME). PT TAKES INDERAL. EF: 65% (ST ECHO 6/21/22). TASK TO DR. JEFFERSON & AG, MARCIO. NORMAL DEVICE FUNCTION. CH

## 2024-06-26 ENCOUNTER — REMOTE DEVICE CLINIC VISIT (OUTPATIENT)
Dept: CARDIOLOGY CLINIC | Facility: CLINIC | Age: 44
End: 2024-06-26
Payer: COMMERCIAL

## 2024-06-26 DIAGNOSIS — Z95.0 CARDIAC PACEMAKER IN SITU: Primary | ICD-10-CM

## 2024-06-26 PROCEDURE — 93294 REM INTERROG EVL PM/LDLS PM: CPT | Performed by: INTERNAL MEDICINE

## 2024-06-26 PROCEDURE — 93296 REM INTERROG EVL PM/IDS: CPT | Performed by: INTERNAL MEDICINE

## 2024-06-26 NOTE — PROGRESS NOTES
"Results for orders placed or performed in visit on 06/26/24   Cardiac EP device report    Narrative    MDT DUAL PM/ ACTIVE SYSTEM IS MRI CONDITIONAL  CARELINK TRANSMISSION: BATTERY STATUS \"11 YRS.\" AP 58%  15%. ALL AVAILABLE LEAD PARAMETERS WITHIN NORMAL LIMITS. 6 FAST A/V NOTED; RATES 150-175 BPM; PT ON PROPRANOLOL & EF 65% (ECHO 2022). SVT VS STACH CAN'T BE EXCLUDED. NORMAL DEVICE FUNCTION. NC         "

## 2024-07-09 ENCOUNTER — TELEPHONE (OUTPATIENT)
Dept: CARDIOLOGY CLINIC | Facility: CLINIC | Age: 44
End: 2024-07-09

## 2024-07-09 NOTE — TELEPHONE ENCOUNTER
7/9/24 Place call to patient to r/s his no show for his pacemaker in [facility]. Cr

## 2024-10-13 DIAGNOSIS — R00.2 PALPITATIONS: ICD-10-CM

## 2024-10-14 ENCOUNTER — REMOTE DEVICE CLINIC VISIT (OUTPATIENT)
Dept: CARDIOLOGY CLINIC | Facility: CLINIC | Age: 44
End: 2024-10-14
Payer: COMMERCIAL

## 2024-10-14 DIAGNOSIS — Z95.0 PRESENCE OF PERMANENT CARDIAC PACEMAKER: Primary | ICD-10-CM

## 2024-10-14 PROCEDURE — 93296 REM INTERROG EVL PM/IDS: CPT | Performed by: INTERNAL MEDICINE

## 2024-10-14 PROCEDURE — 93294 REM INTERROG EVL PM/LDLS PM: CPT | Performed by: INTERNAL MEDICINE

## 2024-10-14 NOTE — PROGRESS NOTES
Results for orders placed or performed in visit on 10/14/24   Cardiac EP device report    Narrative    MDT DUAL PM/ ACTIVE SYSTEM IS MRI CONDITIONAL  CARELINK TRANSMISSION: BATTERY VOLTAGE ADEQUATE. (10.8 YRS) AP 59%  13%. ALL AVAILABLE LEAD PARAMETERS WITHIN NORMAL LIMITS. 1 VHR EPISODE DETECTED 14 BEATS @ 380ms. 4 FAST A & V EPISODES DETECTED, SVT NOTED. PATIENT IS ON PROPRANOLOL; EF 65% (2021). NORMAL DEVICE FUNCTION.---THOMAS

## 2024-10-15 RX ORDER — PROPRANOLOL HYDROCHLORIDE 40 MG/1
40 TABLET ORAL 2 TIMES DAILY
Qty: 180 TABLET | Refills: 1 | Status: SHIPPED | OUTPATIENT
Start: 2024-10-15

## 2024-11-14 ENCOUNTER — OFFICE VISIT (OUTPATIENT)
Dept: CARDIOLOGY CLINIC | Facility: CLINIC | Age: 44
End: 2024-11-14
Payer: COMMERCIAL

## 2024-11-14 VITALS
SYSTOLIC BLOOD PRESSURE: 118 MMHG | DIASTOLIC BLOOD PRESSURE: 72 MMHG | HEART RATE: 80 BPM | TEMPERATURE: 98.7 F | WEIGHT: 297.6 LBS | HEIGHT: 72 IN | OXYGEN SATURATION: 97 % | BODY MASS INDEX: 40.31 KG/M2

## 2024-11-14 DIAGNOSIS — G47.33 OSA (OBSTRUCTIVE SLEEP APNEA): ICD-10-CM

## 2024-11-14 DIAGNOSIS — R55 SYNCOPE, UNSPECIFIED SYNCOPE TYPE: Primary | ICD-10-CM

## 2024-11-14 DIAGNOSIS — R07.2 PRECORDIAL PAIN: ICD-10-CM

## 2024-11-14 DIAGNOSIS — Z95.0 PACEMAKER: ICD-10-CM

## 2024-11-14 DIAGNOSIS — R00.2 PALPITATIONS: ICD-10-CM

## 2024-11-14 DIAGNOSIS — E66.813 CLASS 3 SEVERE OBESITY DUE TO EXCESS CALORIES WITHOUT SERIOUS COMORBIDITY WITH BODY MASS INDEX (BMI) OF 40.0 TO 44.9 IN ADULT (HCC): ICD-10-CM

## 2024-11-14 DIAGNOSIS — E66.01 CLASS 3 SEVERE OBESITY DUE TO EXCESS CALORIES WITHOUT SERIOUS COMORBIDITY WITH BODY MASS INDEX (BMI) OF 40.0 TO 44.9 IN ADULT (HCC): ICD-10-CM

## 2024-11-14 DIAGNOSIS — R00.1 BRADYCARDIA: ICD-10-CM

## 2024-11-14 PROCEDURE — 99214 OFFICE O/P EST MOD 30 MIN: CPT | Performed by: NURSE PRACTITIONER

## 2024-11-14 PROCEDURE — 93000 ELECTROCARDIOGRAM COMPLETE: CPT | Performed by: NURSE PRACTITIONER

## 2024-11-14 NOTE — ASSESSMENT & PLAN NOTE
Previously experiencing episodic mid sternal chest pressure resolved with nitro.  He underwent a stress echo 6/21/2022 which showed no evidence of ischemia at target heart rate. EF 65%.   Cardiac CTA 7/11/2024 showed normal coronaries with calcium score of 0.  Reassurance provided.  No recurrent symptoms in the past year.

## 2024-11-14 NOTE — ASSESSMENT & PLAN NOTE
History of INDRA as well as episodes of central sleep apnea noted.  He uses CPAP faithfully and follows with sleep medicine.

## 2024-11-14 NOTE — ASSESSMENT & PLAN NOTE
"Patient notes episodes of rapid heart rates in associated with anxiety/stress. Also now reports \"pounding\".  Device interrogations have revealed SVT vs ST.  Symptoms controlled with propranolol 40 mg BID. Continue current dosing. Monitoring HR's on device transmissions.  "

## 2024-11-14 NOTE — ASSESSMENT & PLAN NOTE
Pt has a history of syncope in his teenage years along with a history of bradycardia/junctional rhythm.  He was evaluated by cardiology in [place] at that time and diagnosed with vasovagal syncope. He was prescribed midodrine at that time.  He does report episodes of near-syncope in the recent past, evaluated by his PCP with ECG in office showing bradycardia in 40's.  Recurrent witnessed syncopal events - at home after surgery and then 2 witnessed events in the ER.  Telemetry review indicates heart rate in the 40's the evening of hospital admission. Strips reviewed with EP, who felt this was consistent with vasovagal bradycardia.  Echo 11/9/2021 with EF 65%  14 day AT O 12/1-12/15/21 reveals HR  with avg 62 bpm. Heart rate does go down into the 30's while awake. No high grade block or pauses identified.  Met with [facility] EP Dr. Marroquin and subsequently underwent dual chamber PPM insertion 3/18/22 with complete resolution of symptoms.  No recurrent syncope since PPM insertion.

## 2024-11-14 NOTE — PROGRESS NOTES
"Cardiology Follow Up    Ancelmo Lee  1980  [id number]  [facility] [place] [address]  [address] FLO[address]  [facility] [address] [phone]    Ancelmo presents today for routine follow up of syncope, bradycardia s/p PPM, palpitations.     1. Syncope, unspecified syncope type  Assessment & Plan:  Pt has a history of syncope in his teenage years along with a history of bradycardia/junctional rhythm.  He was evaluated by cardiology in [place] at that time and diagnosed with vasovagal syncope. He was prescribed midodrine at that time.  He does report episodes of near-syncope in the recent past, evaluated by his PCP with ECG in office showing bradycardia in 40's.  Recurrent witnessed syncopal events - at home after surgery and then 2 witnessed events in the ER.  Telemetry review indicates heart rate in the 40's the evening of hospital admission. Strips reviewed with EP, who felt this was consistent with vasovagal bradycardia.  Echo 11/9/2021 with EF 65%  14 day AT O 12/1-12/15/21 reveals HR  with avg 62 bpm. Heart rate does go down into the 30's while awake. No high grade block or pauses identified.  Met with [facility] EP Dr. Marroquin and subsequently underwent dual chamber PPM insertion 3/18/22 with complete resolution of symptoms.  No recurrent syncope since PPM insertion.  Orders:  -     POCT ECG  2. Palpitations  Assessment & Plan:  Patient notes episodes of rapid heart rates in associated with anxiety/stress. Also now reports \"pounding\".  Device interrogations have revealed SVT vs ST.  Symptoms controlled with propranolol 40 mg BID. Continue current dosing. Monitoring HR's on device transmissions.  3. Bradycardia  Assessment & Plan:  See discussion under syncope.  Orders:  -     POCT ECG  4. Pacemaker  Assessment & Plan:  Medtronic dual chamber PPM insertion at [facility]\"" 3/18/22 for indication of symptomatic bradycardia with syncope.  [facility] " device clinic is managing.  In person device evaluation scheduled 11/25/2024  Orders:  -     POCT ECG  5. INDRA (obstructive sleep apnea)  Assessment & Plan:  History of INDRA as well as episodes of central sleep apnea noted.  He uses CPAP faithfully and follows with sleep medicine.  6. Precordial pain  Assessment & Plan:  Previously experiencing episodic mid sternal chest pressure resolved with nitro.  He underwent a stress echo 6/21/2022 which showed no evidence of ischemia at target heart rate. EF 65%.   Cardiac CTA 7/11/2024 showed normal coronaries with calcium score of 0.  Reassurance provided.  No recurrent symptoms in the past year.  7. Class 3 severe obesity due to excess calories without serious comorbidity with body mass index (BMI) of 40.0 to 44.9 in adult ([facility])  Assessment & Plan:  Body mass index is 40.36 kg/m².   Continues with steady weight gain.  Reviewed health risks associated with obesity.  Reviewed again today the benefits of dietary and exercise modification for weight control.  Getting updated labs through PCP office.       SANCHEZ Ag has a past medical history of syncope, bradycardia/junctional rhythm, INDRA, obesity, gastritis, pancreatitis, questionable celiac disease with anemia. Previously seen by cardiology in [facility] in teen years for syncope and [facility] in 2019 for episode of chest pain.       He underwent a work up by cardiology in [facility] in his teen years for recurrent syncopal events. He reports undergoing stress tests, Tilt table tests. He was diagnosed with vasovagal syncope and started on Midrodrine. He was instructed to increase water and salt intake. He was eventually lost to follow up.  In 2019 he was seen at [facility] for chest and abdominal pain at which time a cardiac work up was unrevealing other than sinus bradycardia in the 50's noted on ECG. Stress echo was negative for ischemia changes at that time.     He reports that his primary care  provider has performed repeated ECG's at office visits due to complaints of near-syncope revealing HR in the 40's and junctional rhythm.     He presented to [facility] ER on 11/8/2021 following an episode of syncope at home. He underwent ventral hernia repair on 11/7 morning. His wife does assist with providing the history. She states that he was sleeping most of the day and around 5pm she encouraged him to get up, eat, and go to the bathroom. While walking back from the bathroom he leaned against the recliner and stated he did not feel well. His wife supported him as he slumped over, she laid him on the floor. She states he was unresponsive for several minutes. His BP was 70/30 when she checked it. She did not check his HR. She gave him some icing on his lip and he did arouse. Upon EMS arrival BP was 100/60 after wife had him in trendelenburg position and HR was in the 40's. Blood sugar was 151 upon EMS arrival.  He did have another syncopal episode after arriving to the ER, wife believes around 7pm, and then a near-syncopal episode around 10pm.  Telemetry during admission did reveal sinus bradycardia during sleep with no recurrent syncopal events. His telemetry strips were reviewed with our EP, who confirmed consistent with vasovagal bradycardia. He did have an episode of central sleep apnea that occurred during his admission where he was alerted by his CPAP company. He is in communication with his sleep medicine provider about this. Echo 11/9 with EF 65% and no significant abnormalities. He was discharged with plan for 14 day live ZIO.     Ancelmo followed up with me on 2/15/2022 where we reviewed his ZIO results. ZIO revealed avg HR 62 bpm. His slowest HR was 34bpm, which was during sleeping hours, however, his HR was noted to be in the 40-50's on triggered events. He is able to get his heart rate into the 170's with exercise. No high grade blocks or pauses were evident. No syncopal events occurred during the recording  period.  He has modified his eating and lost 30 pounds since December.He also started Concerta through his psychiatrist. He was referred to [facility] for additional evaluation.     Ancelmo met with Dr. Russell on 3/1/2022 at which time he opted to pursue PPM for treatment of symptomatic bradycardia with syncope.  He underwent dual-chamber Medtronic PPM insertion with left posterior fascicle lead by Dr. Russell on 3/18/2022 at [facility]. He followed up with the device clinic for an in-person check on 4/1/2022 at which time the device was functioning well.     He presented to [facility] ER on 5/31/2022 for evaluation of an episode of chest pain. 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 He developed mid and left chest discomfort that was non-radiating associated with nausea, shortness of breath, and diaphoresis. He took 325 mg of aspirin and 3 sublingual nitro which resolved his symptoms. He was chest pain free when he arrived to the ER. Pain resolved within 15 minutes. EKG showed a paced rhythm. HS troponin was negative x 2. Additional lab work and chest xray were unrevealing. He was ultimately discharged home. He reached out to our office regarding the ER visit and a stress echo was ordered.     He underwent an exercise stress echocardiogram on 6/21/2022 which was negative for ischemia with good activity tolerance, achieving 17.2 METs.      Noted to have SVT on device transmissions in spring 2023. He was started on propranolol 10 mg BID.    A cardiac CTA was ordered in June 2024 due to complaints of mid sternal heaviness with exertion for which he took a nitro which resolved symptoms.      Cardiac CTA 7/11/2023 showed normal coronary arteries with calcium score of 0.     He followed up most recently with Dr. Farheen Earl in our office on 11/16/2023 at which time he was doing very well and was advised he may follow up in 1 year.     11/14/2024:  Ancelmo presents for routine follow up. He changed jobs on 11/2 to help reduce his stress. 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 He feels this a more consistent schedule and less stressful. His weight has been fluctuating significantly. He states he was over 300 pounds at one point but has been working on weight loss. He is due for annual labs and will be meeting with his PCP next week. ECG today shows an atrial paced rhythm. He had one episode of SVT on his most recent device check which was short lived. He was asymptomatic with this. He feels the propranolol is working well to control palpitations. He is scheduled for an in-person device interrogation on 11/25/24. He denies any syncope/near syncope. BP has been well controlled.     Medical Problems       Problem List       Syncope    Bradycardia    Pacemaker    INDRA (obstructive sleep apnea)    Class 2 obesity due to excess calories without serious comorbidity with body mass index (BMI) of 37.0 to 37.9 in adult    Palpitations    Precordial pain        Past Medical History:   Diagnosis Date    Anemia     Anxiety     Arrhythmia 1998    BMI 37.0-37.9, adult     Bradycardia     CPAP (continuous positive airway pressure) dependence     Pt states he is compliant with use.    Depression     Disease of thyroid gland 02/05/2023    See endocrinology in may    Gastritis     GERD (gastroesophageal reflux disease)     History of ileus     Pt states it was caused by antibiotic use.    Idiopathic pancreatitis     Irritable bowel syndrome     Sleep apnea     Syncope     in teen years, saw cardiology in [place], given Midrodrine. Diagnosed vasovagal at the time.    Thrombosed hemorrhoids     Ventral hernia     Vitamin D deficiency      Social History     Socioeconomic History    Marital status: /Civil Union     Spouse name: Not on file    Number of children: Not on file    Years of education: Not on file     Highest education level: Not on file   Occupational History    Not on file   Tobacco Use    Smoking status: Never    Smokeless tobacco: Never   Vaping Use    Vaping status: Never Used   Substance and Sexual Activity    Alcohol use: Not Currently     Comment: rarely    Drug use: Not Currently    Sexual activity: Yes     Partners: Female     Birth control/protection: Female Sterilization, None     Comment: defer   Other Topics Concern    Not on file   Social History Narrative    Not on file     Social Drivers of Health     Financial Resource Strain: Low Risk  (10/18/2023)    Received from [facility]    Financial Resource Strain     Do you have any trouble paying for your medications, or do you think you might in the future?: No     Does your family have trouble paying for medicine? (Household - for ages 0-17 years): Not on file   Food Insecurity: No Food Insecurity (10/18/2023)    [facility] from [facility], [facility]    Hunger Vital Sign     Worried About Running Out of Food in the Last Year: Never true     Ran Out of Food in the Last Year: Never true   Transportation Needs: No Transportation Needs (10/18/2023)    Received from [facility]    Transportation Needs     READ ONLY Do you have trouble getting a ride to medical visits or work?: Never True     Does your family have a hard time getting a ride to doctors’ visits? (Household - for ages 0-17 years): Not on file     Has lack of transportation kept you from medical appointments, meetings, work, or from getting things needed for daily living? Check all that apply. (Adult - for ages 18 years and over): Not on file     Do you (or your family) have trouble finding or paying for a ride (transportation)? (Household - for ages 0-17 years): Not on file   Physical Activity: Not on file   Stress: Not on file   Social Connections: Socially Integrated (10/18/2023)    Received from [facility]    Social Connections     How often do you feel lonely or isolated from those around you?:  Never   Intimate Partner Violence: Not At Risk (2/4/2023)    [facility]    Humiliation, Afraid, Rape, and Kick questionnaire     Fear of Current or Ex-Partner: No     Emotionally Abused: No     Physically Abused: No     Sexually Abused: No   Housing Stability: Low Risk  (10/18/2023)    Received from [facility]     Do you currently live in a shelter or have no steady place to sleep at night?: No     READ ONLY Do you think you are at risk of becoming homeless?: No     Does your family worry about paying for your home or becoming homeless? (Household - for ages 0-17 years): Not on file     Are you homeless or worried that you might be in the future? (Adult - for ages 18 years and over): Not on file     Are you (or your family) homeless or worried that you might be in the future? (Household - for ages 0-17 years): Not on file      Family History   Problem Relation Age of Onset    Hypertension Mother     Hyperlipidemia Mother     Diabetes Mother     Hypertension Father     Hyperlipidemia Father     Heart attack Maternal Grandfather      Past Surgical History:   Procedure Laterality Date    ANKLE FRACTURE SURGERY Left     CARDIAC ELECTROPHYSIOLOGY PROCEDURE N/A 03/18/2022    Procedure: Cardiac icd implant/ DUAL CHAMBER PPM;  Surgeon: Wilbur Russell MD;  Location: [facility];  Service: Cardiology    CARDIAC PACEMAKER PLACEMENT      CHOLECYSTECTOMY      HERNIA REPAIR      INGUINAL HERNIA REPAIR Bilateral     Pt reports having as a child.    NC RPR RECRT INCAL/VNT HERNIA REDUCIBLE N/A 11/08/2021    Procedure: VENTRAL HERNIA REPAIR;  Surgeon: Aliza Andrews DO;  Location: [facility];  Service: General    VENTRAL HERNIA REPAIR N/A 02/10/2020    Procedure: REPAIR HERNIA VENTRAL;  Surgeon: Aliza Andrews DO;  Location: [facility];  Service: General       Current Outpatient Medications:     acetaminophen (TYLENOL) 500 mg tablet, Take  500 mg by mouth every 6 (six) hours as needed for mild pain, Disp: , Rfl:     cetirizine (ZyrTEC) 10 mg tablet, Take 10 mg by mouth daily, Disp: , Rfl:     cholecalciferol (VITAMIN D3) 1,000 units tablet, Take 2,000 Units by mouth daily  , Disp: , Rfl:     doxycycline hyclate (VIBRAMYCIN) 100 mg capsule, Take 100 mg by mouth 2 (two) times a day, Disp: , Rfl:     gabapentin (NEURONTIN) 300 mg capsule, Take 300 mg by mouth 3 (three) times a day, Disp: , Rfl:     Hyoscyamine Sulfate SL 0.125 MG SUBL, Place under the tongue as needed, Disp: , Rfl:     ketoconazole (NIZORAL) 2 % cream, , Disp: , Rfl:     ketoconazole (NIZORAL) 2 % shampoo, , Disp: , Rfl:     LORazepam (ATIVAN) 0.5 mg tablet, Take by mouth every 6 (six) hours as needed for anxiety, Disp: , Rfl:     magnesium Oxide (MAG-OX) 400 mg TABS, Take 1 tablet (400 mg total) by mouth daily, Disp: 30 tablet, Rfl: 11    meclizine (ANTIVERT) 25 mg tablet, Take 25 mg by mouth, Disp: , Rfl:     Methylphenidate HCl ER 36 MG TB24, 72 mg, Disp: , Rfl:     montelukast (SINGULAIR) 10 mg tablet, , Disp: , Rfl:     Multiple Vitamin (Multi Vitamin Daily) TABS, Take by mouth daily, Disp: , Rfl:     ondansetron (ZOFRAN-ODT) 4 mg disintegrating tablet, , Disp: , Rfl:     oxiconazole (OXISTAT) 1 % CREA, , Disp: , Rfl:     pantoprazole (PROTONIX) 40 mg tablet, , Disp: , Rfl:     propranolol (INDERAL) 40 mg tablet, Take 1 tablet (40 mg total) by mouth 2 (two) times a day, Disp: 180 tablet, Rfl: 1    tacrolimus (PROTOPIC) 0.1 % ointment, , Disp: , Rfl:     vilazodone (VIIBRYD) 10 mg tablet, , Disp: , Rfl:     vilazodone (VIIBRYD) 20 mg tablet, Take 20 mg by mouth daily Takes 20 mg and 10 mg to equal 30 mg daily., Disp: , Rfl:     omeprazole (PriLOSEC) 20 mg delayed release capsule, Take 20 mg by mouth 2 (two) times a day (Patient not taking: Reported on 11/14/2024), Disp: , Rfl:   Allergies   Allergen Reactions    Bee Pollen Hives    Gluten Meal - Food Allergy GI Intolerance      Reported Celiac's disease    Pollen Extract Other (See Comments)       Labs:     Chemistry        Component Value Date/Time    K 3.7 10/13/2023 0625    K 4.3 02/14/2023 1633     10/13/2023 0625     (H) 02/14/2023 1633    CO2 25 10/13/2023 0625    CO2 27 02/14/2023 1633    BUN 10 10/13/2023 0625    BUN 20 02/14/2023 1633    CREATININE 0.91 10/13/2023 0625    CREATININE 0.90 02/14/2023 1633        Component Value Date/Time    CALCIUM 9.1 10/13/2023 0625    CALCIUM 9.2 02/14/2023 1633    ALKPHOS 101 10/13/2023 0625    ALKPHOS 74 02/04/2023 0614    AST 19 10/13/2023 0625    AST 23 02/04/2023 0614    ALT 22 10/13/2023 0625    ALT 24 02/04/2023 0614        Lipid panel 9/25/2021: C 97. T 79. H 30. L 51.     Cardiac testing:  ECG 11/14/2024: Atrial paced rhythm. Rate 76 bpm.    Cardiac device transmission 10/14/2024:  BATTERY VOLTAGE ADEQUATE. (10.8 YRS) AP 59%  13%. ALL AVAILABLE LEAD PARAMETERS WITHIN NORMAL LIMITS. 1 VHR EPISODE DETECTED 14 BEATS @ 380ms. 4 FAST A & V EPISODES DETECTED, SVT NOTED. NORMAL DEVICE FUNCTION.     Cardiac CTA 7/11/2023:  Normal coronary arteries. Calcium score 0.    Echo exercise stress test 6/21/2022:  Jerzy protocol. 17.2 METs. No chest pain during stress.  Stress ECG was negative for ischemia.   EF 65%. LV cavity has normal reduction in size post-stress. The left ventricle systolic function is hyperdynamic post-stress. The post-stress echo showed normal wall motion which was hyperdynamic compared to baseline.      ECG 5/31/2022: Atrial paced rhythm.     ZIO 12/1/-12/15/2021:  Min HR of 34 bpm, max HR of 178 bpm, and avg HR of 62bpm.   Predominant underlying rhythm was Sinus Rhythm.  Rare PAC's and rare PVC's.     Echocardiogram 11/9/2021:  EF 65%. Left atrium mildly dilated.  Trace MR. Trace TR.     ECG 11/8/2021: Sinus bradycardia. Poor anterior R wave progression is noted.    Review of Systems   Constitutional: Negative.   HENT: Negative.     Cardiovascular:  Negative for  chest pain, dyspnea on exertion, irregular heartbeat, leg swelling, near-syncope, orthopnea and palpitations.   Respiratory:  Negative for cough and snoring.    Endocrine: Negative.    Skin: Negative.    Musculoskeletal: Negative.    Gastrointestinal: Negative.    Genitourinary: Negative.    Neurological: Negative.    Psychiatric/Behavioral: Negative.         Vitals:    11/14/24 0855   BP: 118/72   Pulse: 80   Temp: 98.7 °F (37.1 °C)   SpO2: 97%     Vitals:    11/14/24 0855   Weight: 135 kg (297 lb 9.6 oz)     Height: 6' (182.9 cm)   Body mass index is 40.36 kg/m².    Physical Exam  Vitals and nursing note reviewed.   Constitutional:       General: He is not in acute distress.     Appearance: He is well-developed. He is obese. He is not diaphoretic.   HENT:      Head: Normocephalic and atraumatic.   Neck:      Vascular: No carotid bruit or JVD.   Cardiovascular:      Rate and Rhythm: Normal rate and regular rhythm.      Pulses: Intact distal pulses.      Heart sounds: Normal heart sounds, S1 normal and S2 normal. No murmur heard.     No friction rub. No gallop.   Pulmonary:      Effort: Pulmonary effort is normal. No respiratory distress.      Breath sounds: Normal breath sounds.   Abdominal:      General: There is no distension.      Palpations: Abdomen is soft.      Tenderness: There is no abdominal tenderness.   Skin:     General: Skin is warm and dry.      Findings: No rash.   Neurological:      Mental Status: He is alert and oriented to person, place, and time.   Psychiatric:         Behavior: Behavior normal.

## 2024-11-14 NOTE — ASSESSMENT & PLAN NOTE
Medtronic dual chamber PPM insertion at [facility] 3/18/22 for indication of symptomatic bradycardia with syncope.  [facility]'s device clinic is managing.  In person device evaluation scheduled 11/25/2024

## 2024-11-14 NOTE — ASSESSMENT & PLAN NOTE
Body mass index is 40.36 kg/m².   Continues with steady weight gain.  Reviewed health risks associated with obesity.  Reviewed again today the benefits of dietary and exercise modification for weight control.  Getting updated labs through PCP office.

## 2024-11-14 NOTE — PATIENT INSTRUCTIONS
Continue to work on weight loss - consistency with exercise and diet is important.  Please have PCP order annual labs

## 2024-11-25 ENCOUNTER — IN-CLINIC DEVICE VISIT (OUTPATIENT)
Dept: CARDIOLOGY CLINIC | Facility: CLINIC | Age: 44
End: 2024-11-25
Payer: COMMERCIAL

## 2024-11-25 DIAGNOSIS — R00.1 BRADYCARDIA: ICD-10-CM

## 2024-11-25 DIAGNOSIS — R55 SYNCOPE AND COLLAPSE: Primary | ICD-10-CM

## 2024-11-25 PROCEDURE — 93280 PM DEVICE PROGR EVAL DUAL: CPT | Performed by: INTERNAL MEDICINE

## 2024-11-25 NOTE — PROGRESS NOTES
Results for orders placed or performed in visit on 11/25/24   Cardiac EP device report    Narrative    MDT DUAL PM/ ACTIVE SYSTEM IS MRI CONDITIONAL  DEVICE INTERROGATED IN THE [place] OFFICE: BATTERY VOLTAGE ADEQUATE (10.7 YRS). AP: 57.2%. : 16%. ALL LEAD PARAMETERS WITHIN NORMAL LIMITS. 8 NEW FAST A&V EPISODES W/ AVAIL EGMS SHOWING SVT-ST W/ AVG  BPM. MAX DURATION 2 MINS 31 SECS. PT TAKES INDERAL. NO PROGRAMMING CHANGES MADE TO DEVICE PARAMETERS. NORMAL DEVICE FUNCTION. CH

## 2024-11-26 ENCOUNTER — RESULTS FOLLOW-UP (OUTPATIENT)
Dept: CARDIOLOGY CLINIC | Facility: CLINIC | Age: 44
End: 2024-11-26

## 2025-02-27 ENCOUNTER — RESULTS FOLLOW-UP (OUTPATIENT)
Dept: CARDIOLOGY CLINIC | Facility: CLINIC | Age: 45
End: 2025-02-27

## 2025-02-27 ENCOUNTER — REMOTE DEVICE CLINIC VISIT (OUTPATIENT)
Dept: CARDIOLOGY CLINIC | Facility: CLINIC | Age: 45
End: 2025-02-27
Payer: COMMERCIAL

## 2025-02-27 DIAGNOSIS — Z95.0 CARDIAC PACEMAKER IN SITU: Primary | ICD-10-CM

## 2025-02-27 PROCEDURE — 93296 REM INTERROG EVL PM/IDS: CPT | Performed by: INTERNAL MEDICINE

## 2025-02-27 PROCEDURE — 93294 REM INTERROG EVL PM/LDLS PM: CPT | Performed by: INTERNAL MEDICINE

## 2025-02-27 NOTE — PROGRESS NOTES
"Results for orders placed or performed in visit on 02/27/25   Cardiac EP device report    Narrative    MDT DUAL PM/ ACTIVE SYSTEM IS MRI CONDITIONAL  CARELINK TRANSMISSION: PRESENTING EGRAM AP VS@ 75 BPM. BATTERY STATUS \"10 YRS.\" AP 58%  15%. ALL AVAILABLE LEAD PARAMETERS WITHIN NORMAL LIMITS. 37 FAST A/V NOTED; AVG RATE APPROX 160 BPM. SVT VS STACH CAN'T BE EXCLUDED ON AVAIL EGRAMS. PT ON PROPRANOLOL & EF 65% (ECHO/STS 2022). NORMAL DEVICE FUNCTION. NC         "

## 2025-03-25 NOTE — ASSESSMENT & PLAN NOTE
H and P completed patient cleared for procedure please send to surgeon History of INDRA as well as episodes of central sleep apnea noted  He uses CPAP faithfully and follows with sleep medicine

## 2025-05-29 ENCOUNTER — RESULTS FOLLOW-UP (OUTPATIENT)
Dept: CARDIOLOGY CLINIC | Facility: CLINIC | Age: 45
End: 2025-05-29

## 2025-05-29 ENCOUNTER — REMOTE DEVICE CLINIC VISIT (OUTPATIENT)
Dept: CARDIOLOGY CLINIC | Facility: CLINIC | Age: 45
End: 2025-05-29
Payer: COMMERCIAL

## 2025-05-29 DIAGNOSIS — Z95.0 CARDIAC PACEMAKER IN SITU: Primary | ICD-10-CM

## 2025-05-29 PROCEDURE — 93296 REM INTERROG EVL PM/IDS: CPT | Performed by: INTERNAL MEDICINE

## 2025-05-29 PROCEDURE — 93294 REM INTERROG EVL PM/LDLS PM: CPT | Performed by: INTERNAL MEDICINE

## 2025-05-29 NOTE — PROGRESS NOTES
"Results for orders placed or performed in visit on 05/29/25   Cardiac EP device report    Narrative    MDT DUAL PM/ ACTIVE SYSTEM IS MRI CONDITIONAL  CARELINK TRANSMISSION: PRESENTING EGRAM AP VS@ 69 BPM. BATTERY STATUS \"10 YRS.\" AP 65%  13%. ALL AVAILABLE LEAD PARAMETERS WITHIN NORMAL LIMITS. 72 FAST A/V NOTED; AVG RATE 158 BPM; PT ON PROPRANOLOL. STACH VS SVT SHOULD BE EXCLUDED ON AVAIL EGRAM. EF 65% (ESTS 2022). NORMAL DEVICE FUNCTION. NC         "

## 2025-06-16 DIAGNOSIS — R00.2 PALPITATIONS: ICD-10-CM

## 2025-06-16 RX ORDER — PROPRANOLOL HYDROCHLORIDE 40 MG/1
40 TABLET ORAL 2 TIMES DAILY
Qty: 180 TABLET | Refills: 0 | Status: SHIPPED | OUTPATIENT
Start: 2025-06-16

## (undated) DEVICE — SUT PROLENE 0 CT-2 30 IN 8412H

## (undated) DEVICE — GLOVE INDICATOR PI UNDERGLOVE SZ 7 BLUE

## (undated) DEVICE — BETHLEHEM UNIVERSAL MINOR GEN: Brand: CARDINAL HEALTH

## (undated) DEVICE — SUT VICRYL 3-0 SH 27 IN J416H

## (undated) DEVICE — GLOVE SRG BIOGEL ECLIPSE 7

## (undated) DEVICE — NEEDLE 25G X 1 1/2

## (undated) DEVICE — PAD GROUNDING ADULT

## (undated) DEVICE — DRAPE EQUIPMENT RF WAND

## (undated) DEVICE — TUBING SUCTION 5MM X 12 FT

## (undated) DEVICE — 4-PORT MANIFOLD: Brand: NEPTUNE 2

## (undated) DEVICE — INTENDED FOR TISSUE SEPARATION, AND OTHER PROCEDURES THAT REQUIRE A SHARP SURGICAL BLADE TO PUNCTURE OR CUT.: Brand: BARD-PARKER SAFETY BLADES SIZE 15, STERILE

## (undated) DEVICE — MICROPUNCTURE INTRODUCER SET SILHOUETTE TRANSITIONLESS WITH STAINLESS STEEL WIRE GUIDE: Brand: MICROPUNCTURE

## (undated) DEVICE — SUT VICRYL 4-0 PS-2 27 IN J426H

## (undated) DEVICE — ADHESIVE SKIN HIGH VISCOSITY EXOFIN 1ML

## (undated) DEVICE — DGW .035 FC J3MM 150CM TEF: Brand: EMERALD

## (undated) DEVICE — INTRO SHEATH PEEL AWAY 7FR

## (undated) DEVICE — VIOLET BRAIDED (POLYGLACTIN 910), SYNTHETIC ABSORBABLE SUTURE: Brand: COATED VICRYL

## (undated) DEVICE — PLUMEPEN PRO 10FT

## (undated) DEVICE — VIAL DECANTER

## (undated) DEVICE — BULB SYRINGE,IRRIGATION WITH PROTECTIVE CAP: Brand: DOVER

## (undated) DEVICE — SUT VICRYL 3-0 CT-1 27 IN J258H

## (undated) DEVICE — CHLORAPREP HI-LITE 26ML ORANGE

## (undated) DEVICE — GAUZE SPONGES,16 PLY: Brand: CURITY

## (undated) DEVICE — CATH GUIDING FIXED SHAPE 43CM -NC

## (undated) DEVICE — SLITTER ADJUSTABLE

## (undated) DEVICE — 3000CC GUARDIAN II: Brand: GUARDIAN